# Patient Record
Sex: MALE | Race: WHITE | NOT HISPANIC OR LATINO | Employment: OTHER | URBAN - METROPOLITAN AREA
[De-identification: names, ages, dates, MRNs, and addresses within clinical notes are randomized per-mention and may not be internally consistent; named-entity substitution may affect disease eponyms.]

---

## 2017-04-17 ENCOUNTER — ALLSCRIPTS OFFICE VISIT (OUTPATIENT)
Dept: OTHER | Facility: OTHER | Age: 62
End: 2017-04-17

## 2017-05-19 ENCOUNTER — ALLSCRIPTS OFFICE VISIT (OUTPATIENT)
Dept: OTHER | Facility: OTHER | Age: 62
End: 2017-05-19

## 2017-05-19 DIAGNOSIS — M10.9 GOUT: ICD-10-CM

## 2017-05-19 DIAGNOSIS — E11.9 TYPE 2 DIABETES MELLITUS WITHOUT COMPLICATIONS (HCC): ICD-10-CM

## 2017-05-19 DIAGNOSIS — I10 ESSENTIAL (PRIMARY) HYPERTENSION: ICD-10-CM

## 2017-05-25 ENCOUNTER — APPOINTMENT (OUTPATIENT)
Dept: LAB | Age: 62
End: 2017-05-25
Payer: COMMERCIAL

## 2017-05-25 ENCOUNTER — TRANSCRIBE ORDERS (OUTPATIENT)
Dept: ADMINISTRATIVE | Age: 62
End: 2017-05-25

## 2017-05-25 DIAGNOSIS — E11.9 TYPE 2 DIABETES MELLITUS WITHOUT COMPLICATIONS (HCC): ICD-10-CM

## 2017-05-25 DIAGNOSIS — I10 ESSENTIAL (PRIMARY) HYPERTENSION: ICD-10-CM

## 2017-05-25 DIAGNOSIS — M10.9 GOUT: ICD-10-CM

## 2017-05-25 LAB
ALBUMIN SERPL BCP-MCNC: 3.9 G/DL (ref 3.5–5)
ALP SERPL-CCNC: 65 U/L (ref 46–116)
ALT SERPL W P-5'-P-CCNC: 35 U/L (ref 12–78)
ANION GAP SERPL CALCULATED.3IONS-SCNC: 5 MMOL/L (ref 4–13)
AST SERPL W P-5'-P-CCNC: 23 U/L (ref 5–45)
BILIRUB SERPL-MCNC: 0.52 MG/DL (ref 0.2–1)
BUN SERPL-MCNC: 21 MG/DL (ref 5–25)
CALCIUM SERPL-MCNC: 8.7 MG/DL (ref 8.3–10.1)
CHLORIDE SERPL-SCNC: 105 MMOL/L (ref 100–108)
CHOLEST SERPL-MCNC: 177 MG/DL (ref 50–200)
CO2 SERPL-SCNC: 27 MMOL/L (ref 21–32)
CREAT SERPL-MCNC: 1.06 MG/DL (ref 0.6–1.3)
EST. AVERAGE GLUCOSE BLD GHB EST-MCNC: 146 MG/DL
GFR SERPL CREATININE-BSD FRML MDRD: >60 ML/MIN/1.73SQ M
GLUCOSE P FAST SERPL-MCNC: 107 MG/DL (ref 65–99)
HBA1C MFR BLD: 6.7 % (ref 4.2–6.3)
HDLC SERPL-MCNC: 50 MG/DL (ref 40–60)
LDLC SERPL CALC-MCNC: 99 MG/DL (ref 0–100)
POTASSIUM SERPL-SCNC: 5.4 MMOL/L (ref 3.5–5.3)
PROT SERPL-MCNC: 7 G/DL (ref 6.4–8.2)
SODIUM SERPL-SCNC: 137 MMOL/L (ref 136–145)
TRIGL SERPL-MCNC: 140 MG/DL
TSH SERPL DL<=0.05 MIU/L-ACNC: 1.68 UIU/ML (ref 0.36–3.74)
URATE SERPL-MCNC: 3.8 MG/DL (ref 4.2–8)

## 2017-05-25 PROCEDURE — 84550 ASSAY OF BLOOD/URIC ACID: CPT

## 2017-05-25 PROCEDURE — 36415 COLL VENOUS BLD VENIPUNCTURE: CPT

## 2017-05-25 PROCEDURE — 83036 HEMOGLOBIN GLYCOSYLATED A1C: CPT

## 2017-05-25 PROCEDURE — 80053 COMPREHEN METABOLIC PANEL: CPT

## 2017-05-25 PROCEDURE — 80061 LIPID PANEL: CPT

## 2017-05-25 PROCEDURE — 84443 ASSAY THYROID STIM HORMONE: CPT

## 2017-05-26 ENCOUNTER — GENERIC CONVERSION - ENCOUNTER (OUTPATIENT)
Dept: OTHER | Facility: OTHER | Age: 62
End: 2017-05-26

## 2017-06-20 ENCOUNTER — ALLSCRIPTS OFFICE VISIT (OUTPATIENT)
Dept: OTHER | Facility: OTHER | Age: 62
End: 2017-06-20

## 2017-06-20 DIAGNOSIS — M71.9 BURSOPATHY: ICD-10-CM

## 2017-06-20 LAB
BILIRUB UR QL STRIP: NORMAL
CLARITY UR: NORMAL
COLOR UR: YELLOW
GLUCOSE (HISTORICAL): NORMAL
HGB UR QL STRIP.AUTO: NORMAL
KETONES UR STRIP-MCNC: NORMAL MG/DL
LEUKOCYTE ESTERASE UR QL STRIP: NORMAL
NITRITE UR QL STRIP: NORMAL
PH UR STRIP.AUTO: 6 [PH]
PROT UR STRIP-MCNC: NORMAL MG/DL
SP GR UR STRIP.AUTO: 1.01
UROBILINOGEN UR QL STRIP.AUTO: 0.2

## 2017-06-22 ENCOUNTER — ALLSCRIPTS OFFICE VISIT (OUTPATIENT)
Dept: OTHER | Facility: OTHER | Age: 62
End: 2017-06-22

## 2017-06-22 LAB — OCCULT BLD, FECAL IMMUNOLOGICAL (HISTORICAL): NEGATIVE

## 2017-10-08 ENCOUNTER — GENERIC CONVERSION - ENCOUNTER (OUTPATIENT)
Dept: OTHER | Facility: OTHER | Age: 62
End: 2017-10-08

## 2017-10-08 ENCOUNTER — HOSPITAL ENCOUNTER (EMERGENCY)
Facility: HOSPITAL | Age: 62
Discharge: HOME | End: 2017-10-08
Attending: EMERGENCY MEDICINE
Payer: COMMERCIAL

## 2017-10-08 ENCOUNTER — APPOINTMENT (EMERGENCY)
Dept: RADIOLOGY | Facility: HOSPITAL | Age: 62
End: 2017-10-08
Payer: COMMERCIAL

## 2017-10-08 VITALS
HEIGHT: 69 IN | TEMPERATURE: 98.5 F | WEIGHT: 200 LBS | RESPIRATION RATE: 16 BRPM | BODY MASS INDEX: 29.62 KG/M2 | SYSTOLIC BLOOD PRESSURE: 151 MMHG | HEART RATE: 99 BPM | DIASTOLIC BLOOD PRESSURE: 117 MMHG | OXYGEN SATURATION: 97 %

## 2017-10-08 DIAGNOSIS — I99.8 ISCHEMIC FINGER: ICD-10-CM

## 2017-10-08 DIAGNOSIS — S61.209A DEGLOVING INJURY OF FINGER, INITIAL ENCOUNTER: Primary | ICD-10-CM

## 2017-10-08 PROCEDURE — 90715 TDAP VACCINE 7 YRS/> IM: CPT | Performed by: EMERGENCY MEDICINE

## 2017-10-08 PROCEDURE — 96365 THER/PROPH/DIAG IV INF INIT: CPT

## 2017-10-08 PROCEDURE — 90471 IMMUNIZATION ADMIN: CPT

## 2017-10-08 PROCEDURE — 99284 EMERGENCY DEPT VISIT MOD MDM: CPT

## 2017-10-08 PROCEDURE — 73140 X-RAY EXAM OF FINGER(S): CPT

## 2017-10-08 RX ORDER — BACITRACIN, NEOMYCIN, POLYMYXIN B 400; 3.5; 5 [USP'U]/G; MG/G; [USP'U]/G
1 OINTMENT TOPICAL 2 TIMES DAILY
Status: DISCONTINUED | OUTPATIENT
Start: 2017-10-08 | End: 2017-10-08 | Stop reason: HOSPADM

## 2017-10-08 RX ORDER — CEPHALEXIN 500 MG/1
500 CAPSULE ORAL 4 TIMES DAILY
Qty: 28 CAPSULE | Refills: 0 | Status: SHIPPED | OUTPATIENT
Start: 2017-10-08 | End: 2017-10-15

## 2017-10-08 RX ORDER — TRAMADOL HYDROCHLORIDE 50 MG/1
50 TABLET ORAL EVERY 6 HOURS PRN
Qty: 20 TABLET | Refills: 0 | Status: SHIPPED | OUTPATIENT
Start: 2017-10-08 | End: 2017-10-18

## 2017-10-08 RX ORDER — ACETAMINOPHEN 500 MG
1000 TABLET ORAL EVERY 6 HOURS PRN
Qty: 30 TABLET | Refills: 0 | Status: SHIPPED | OUTPATIENT
Start: 2017-10-08 | End: 2018-01-04 | Stop reason: ALTCHOICE

## 2017-10-08 RX ORDER — NAPROXEN 500 MG/1
500 TABLET ORAL 2 TIMES DAILY WITH MEALS
Qty: 20 TABLET | Refills: 0 | Status: SHIPPED | OUTPATIENT
Start: 2017-10-08 | End: 2018-01-04 | Stop reason: ALTCHOICE

## 2017-10-08 RX ORDER — BUPIVACAINE HYDROCHLORIDE 5 MG/ML
15 INJECTION, SOLUTION EPIDURAL; INTRACAUDAL ONCE
Status: COMPLETED | OUTPATIENT
Start: 2017-10-08 | End: 2017-10-08

## 2017-10-08 RX ADMIN — CEFAZOLIN SODIUM 2000 MG: 2 SOLUTION INTRAVENOUS at 10:25

## 2017-10-08 RX ADMIN — BACITRACIN, NEOMYCIN, POLYMYXIN B 1 SMALL APPLICATION: 400; 3.5; 5 OINTMENT TOPICAL at 13:20

## 2017-10-08 RX ADMIN — TETANUS TOXOID, REDUCED DIPHTHERIA TOXOID AND ACELLULAR PERTUSSIS VACCINE, ADSORBED 0.5 ML: 5; 2.5; 8; 8; 2.5 SUSPENSION INTRAMUSCULAR at 10:12

## 2017-10-08 RX ADMIN — BUPIVACAINE HYDROCHLORIDE 15 ML: 5 INJECTION, SOLUTION EPIDURAL; INTRACAUDAL at 10:12

## 2017-10-08 NOTE — ED RE-EVALUATION NOTE
Patient's ring successfully removed the wound was copiously irrigated under high flow to tap water patient is unable to flex the digit either at the PIP or the DIP  There is concern for injury to the tendon complex specifically since this is a zone 2 flexor tendon injury will discuss with hand surgery prior to any sort of attempt at repair

## 2017-10-08 NOTE — ED PROVIDER NOTES
History  Chief Complaint   Patient presents with    Finger Injury - Major     Pt fell 8 ft from a ladder causing a laceration to his L ring finger     This is a 57 y o  old male who presents to the ED for evaluation of finger injury  Patient was climbing a ladder when he fell off and caught his ring on his L ring finger on the ladder during the fall  Has a partial degloving of his finger  There is bleeding in the area  Has decreased range of motion  His ring is still stuck on the finger  The finger is discolored  Is unsure of his last tetanus shot the thinks it is 8-10 years ago  None     Past Medical History:   Diagnosis Date    Hyperlipidemia     Hypertension      History reviewed  No pertinent surgical history  History reviewed  No pertinent family history  I have reviewed and agree with the history as documented  Social History   Substance Use Topics    Smoking status: Never Smoker    Smokeless tobacco: Never Used    Alcohol use Yes     Review of Systems   Constitutional: Negative for chills, fatigue, fever and unexpected weight change  HENT: Negative for congestion, rhinorrhea and sore throat  Eyes: Negative for redness and visual disturbance  Respiratory: Negative for cough and shortness of breath  Cardiovascular: Negative for chest pain and leg swelling  Gastrointestinal: Negative for abdominal pain, constipation, diarrhea, nausea and vomiting  Endocrine: Negative for cold intolerance and heat intolerance  Genitourinary: Negative for dysuria, frequency and urgency  Musculoskeletal: Negative for back pain  Skin: Positive for wound  Negative for rash  Neurological: Negative for dizziness, syncope and numbness  All other systems reviewed and are negative      Physical Exam  ED Triage Vitals [10/08/17 1005]   Temperature Pulse Respirations Blood Pressure SpO2   98 5 °F (36 9 °C) 97 18 (!) 178/82 98 %      Temp Source Heart Rate Source Patient Position - Orthostatic VS BP Location FiO2 (%)   Tympanic Monitor Sitting Right arm --      Pain Score       8         Physical Exam   Constitutional: He is oriented to person, place, and time  He appears well-developed and well-nourished  No distress  HENT:   Head: Normocephalic and atraumatic  Nose: Nose normal    Mouth/Throat: Oropharynx is clear and moist  No oropharyngeal exudate  Eyes: EOM are normal  Pupils are equal, round, and reactive to light  Right eye exhibits no discharge  Left eye exhibits no discharge  Neck: Normal range of motion  Neck supple  No JVD present  No tracheal deviation present  No midline spinal tenderness, no step offs or deformity  Cardiovascular: Normal rate, regular rhythm and normal heart sounds  No murmur heard  Pulses:       Carotid pulses are 2+ on the right side, and 2+ on the left side  Radial pulses are 2+ on the right side, and 2+ on the left side  Femoral pulses are 2+ on the right side, and 2+ on the left side  Dorsalis pedis pulses are 2+ on the right side, and 2+ on the left side  Pulmonary/Chest: Effort normal  No accessory muscle usage  No respiratory distress  He has no decreased breath sounds  He has no wheezes  He has no rales  He exhibits no tenderness, no bony tenderness, no edema and no deformity  Abdominal: Soft  Normal appearance and bowel sounds are normal  He exhibits no mass  There is no tenderness  There is no rigidity and no rebound  No ecchymosis or abrasions, no seat belt sign   Musculoskeletal: Normal range of motion  He exhibits no edema or tenderness  There is a partial degloving of the left ring finger  The lateral aspect of the finger is involved more so than the medial   He has decreased range of motion especially at the DIPJ  There is decreased sensation  Cap refills approximately 10 seconds  Neurological: He is alert and oriented to person, place, and time  He has normal strength   No cranial nerve deficit or sensory deficit  He exhibits normal muscle tone  Coordination normal  GCS eye subscore is 4  GCS verbal subscore is 5  GCS motor subscore is 6  Normal gait   Skin: Skin is warm and dry  Capillary refill takes less than 2 seconds  No rash noted  He is not diaphoretic  Nursing note and vitals reviewed  ED Medications  Medications   ceFAZolin (ANCEF) IVPB (premix) 2,000 mg (0 mg Intravenous Stopped 10/8/17 1126)   tetanus-diphtheria-acellular pertussis (BOOSTRIX) IM injection 0 5 mL (0 5 mL Intramuscular Given 10/8/17 1012)   bupivacaine (PF) (MARCAINE) 0 5 % injection 15 mL (15 mL Injection Given by Other 10/8/17 1012)     Diagnostic Studies  Labs Reviewed - No data to display    XR finger fourth digit-ring LEFT   ED Interpretation   Small avulsion fracture at the base of the middle phalynx of the   4th digit, as interpreted by me  Final Result      Soft tissue swelling/laceration involving the proximal aspect of the 4th digit  No fracture  Workstation performed: [id number]           Procedures  Procedures    Phone Consults  ED Phone Contact    ED Course    A/P: This is a 57 y o  male who presents to the ED for evaluation of finger injury  Given the discoloration, concerned about vascular injury  Plastics was consulted and after initial plan of loose repaired outpatient follow-up, Dr Jhonny Kauffman did elect to come to the ED to explore and repair the wound at this time  I did update the patient's tetanus  IV antibiotics  Digital block for pain  Dr Jhonny Kauffman has seen and evaluated the patient  He repaired the wound as well as applied a splint  Splint was evaluated by me following his placement and distal neurovascular in the unaffected digits was intact  PO ABX for a week  Return precautions discussed  Patient and family acknowledge receipt of same  We will discharge this patient home with plastics follow-up on Friday  Patient is in agreement with this plan as outlined above      SAKINA Fernandez Time    Disposition  Final diagnoses:   Degloving injury of finger, initial encounter   Ischemic finger     ED Disposition     ED Disposition Condition Comment    Discharge  Wells Liliya discharge to home/self care  Condition at discharge: Stable        Follow-up Information     Follow up With Specialties Details Why [address] MD Plastic Surgery Go on 10/13/2017 For reevaluation of finger injury [place]          Discharge Medication List as of 10/8/2017  2:21 PM      START taking these medications    Details   acetaminophen (TYLENOL) 500 mg tablet Take 2 tablets by mouth every 6 (six) hours as needed for mild pain, Starting Sun 10/8/2017, Print      cephalexin (KEFLEX) 500 mg capsule Take 1 capsule by mouth 4 (four) times a day for 7 days, Starting Sun 10/8/2017, Until Sun 10/15/2017, Print      naproxen (NAPROSYN) 500 mg tablet Take 1 tablet by mouth 2 (two) times a day with meals, Starting Sun 10/8/2017, Print      traMADol (ULTRAM) 50 mg tablet Take 1 tablet by mouth every 6 (six) hours as needed for moderate pain for up to 10 days, Starting Sun 10/8/2017, Until Wed 10/18/2017, Print           No discharge procedures on file  ED Provider  Attending physically available and evaluated Geoffrey Lovell I managed the patient along with the ED Attending      Electronically Signed by       Kendal Nguyen MD  Resident  10/08/17 7812

## 2017-10-08 NOTE — ED RE-EVALUATION NOTE
After ring removal and wash out, digit remains somewhat dusky in appearance  Colder than other digits, and cap refill is 10s  Dr garcía repaged and is on his way in to evaluate patient

## 2017-10-08 NOTE — CONSULTS
Consultation - Plastic Surgery   Michael Murphy 57 y o  male MRN: [id number]  Unit/Bed#: [facility] Encounter: [id number]      Assessment/Plan      Assessment:  Avulsion laceration left ring finger with ulnar digital nerve laceration   Plan:  Repair of left ring finger avulsion laceration with ulnar digital nerve repair in the ED  Please see separate procedure note    Discharged home on 1 week of Keflex 500 mg p o  q i d  Keep splint clean dry and intact until follow-up appointment on Friday  Elevate left hand above level of heart at all times  History of Present Illness   Physician Requesting Consult: Carissa Reilly DO  Reason for Consult / Principal Problem:  Avulsion laceration left ring finger  Hx and PE limited by:   HPI: Michael Murphy is a 57y o  year old right-hand-dominant male who presents with a left ring finger avulsion laceration sustained after falling off a ladder, catching his wedding ring on a ladder rung and falling to the ground  He was cleared for any further injuries  There was concern for ischemia of the left ring finger and possible tendon injury  I am asked to evaluate  Consults    Review of Systems   Constitutional: Negative  HENT: Negative  Eyes: Negative  Respiratory: Negative  Cardiovascular: Negative  Gastrointestinal: Negative  Endocrine: Negative  Genitourinary: Negative  Musculoskeletal: Negative  Skin: Positive for color change  Laceration left ring finger   Allergic/Immunologic: Negative  Neurological: Negative  Hematological: Negative  Psychiatric/Behavioral: Negative  Historical Information   Past Medical History:   Diagnosis Date    Hyperlipidemia     Hypertension      History reviewed  No pertinent surgical history    Social History   History   Alcohol Use    Yes     History   Drug Use No     History   Smoking Status    Never Smoker   Smokeless Tobacco    Never Used     Family History: non-contributory    Meds/Allergies   all current active meds have been reviewed    No Known Allergies    Objective   No intake or output data in the 24 hours ending 10/08/17 1328    Invasive Devices     Peripheral Intravenous Line            Peripheral IV 10/08/17 Right Forearm less than 1 day                Physical Exam   Constitutional: He is oriented to person, place, and time  He appears well-developed and well-nourished  HENT:   Head: Normocephalic and atraumatic  Eyes: Conjunctivae are normal  Pupils are equal, round, and reactive to light  Cardiovascular: Normal rate and regular rhythm  Pulmonary/Chest: Effort normal and breath sounds normal    Abdominal: Soft  Musculoskeletal:   Left ring finger range of motion limited by swelling at the PIP joint  Both passive and active range of motion present at PIP joint  Absent range of motion at DIP joint  Passive range of motion intact at DIP joint  Digital nerve block was performed by the ED  Sensory interpretation to left ring finger unable to be performed  Avulsion laceration near circumferential to left ring finger  Radial digital artery nerve and vein intact  Ulnar digital artery nerve and vein lacerated  Neurological: He is alert and oriented to person, place, and time  Skin: Skin is warm and dry  Psychiatric: He has a normal mood and affect  His behavior is normal        Lab Results:   No results found for: WBC, HGB, HCT, MCV, PLT   No results found for: TISSUECULT, WOUNDCULT  Imaging Studies:   EKG, Pathology, and Other Studies:   No results found for: FINALDX    Counseling / Coordination of Care  Total floor / unit time spent today 30 minutes  Greater than 50% of total time was spent with the patient and / or family counseling and / or coordination of care  A description of the counseling / coordination of care:  Coordinating procedure in the ED

## 2017-10-08 NOTE — DISCHARGE INSTRUCTIONS
Finger Laceration   WHAT YOU NEED TO KNOW:   A finger laceration is a deep cut in your skin  It is often caused by a sharp object, such as a knife, or blunt force to your finger  Your blood vessels, bones, joints, tendons, or nerves may also be injured  DISCHARGE INSTRUCTIONS:   Return to the emergency department if:   · Your wound comes apart  · Blood soaks through your bandage  · You have severe pain in your finger or hand  · Your finger is pale and cold  · You have sudden trouble moving your finger  · Your swelling suddenly gets worse  · You have red streaks on your skin coming from your wound  Contact your healthcare provider or hand specialist if:   · You have new numbness or tingling  · Your finger feels warm, looks swollen or red, and is draining pus  · You have a fever  · You have questions or concerns about your condition or care  Medicines: You may  need any of the following:  · Antibiotics  help prevent a bacterial infection  · Acetaminophen  decreases pain and fever  It is available without a doctor's order  Ask how much to take and how often to take it  Follow directions  Read the labels of all other medicines you are using to see if they also contain acetaminophen, or ask your doctor or pharmacist  Acetaminophen can cause liver damage if not taken correctly  Do not use more than 4 grams (4,000 milligrams) total of acetaminophen in one day  · Prescription pain medicine  may be given  Ask your healthcare provider how to take this medicine safely  Some prescription pain medicines contain acetaminophen  Do not take other medicines that contain acetaminophen without talking to your healthcare provider  Too much acetaminophen may cause liver damage  Prescription pain medicine may cause constipation  Ask your healthcare provider how to prevent or treat constipation  · Take your medicine as directed    Contact your healthcare provider if you think your medicine is not helping or if you have side effects  Tell him or her if you are allergic to any medicine  Keep a list of the medicines, vitamins, and herbs you take  Include the amounts, and when and why you take them  Bring the list or the pill bottles to follow-up visits  Carry your medicine list with you in case of an emergency  Self-care:   · Apply ice  on your finger for 15 to 20 minutes every hour or as directed  Use an ice pack, or put crushed ice in a plastic bag  Cover it with a towel before you apply it to your skin  Ice helps prevent tissue damage and decreases swelling and pain  · Elevate  your hand above the level of your heart as often as you can  This will help decrease swelling and pain  Prop your hand on pillows or blankets to keep it elevated comfortably  · Wear your splint as directed  A splint will decrease movement and stress on your wound  The splint may help your wound heal faster  Ask your healthcare provider how to apply and remove a splint  · Apply ointments to decrease scarring  Do not apply ointments until your healthcare provider says it is okay  You may need to wait until your wound is healed  Ask which ointment to buy and how often to use it  Wound care:   · Do not get your wound wet until your healthcare provider says it is okay  Do not soak your hand in water  Do not go swimming until your healthcare provider says it is okay  When your healthcare provider says it is okay, carefully wash around the wound with soap and water  Let soap and water run over your wound  Gently pat the area dry or allow it to air dry  · Change your bandages when they get wet, dirty, or after washing  Apply new, clean bandages as directed  Do not apply elastic bandages or tape too tightly  Do not put powders or lotions on your wound  · Apply antibiotic ointment as directed  Your healthcare provider may give you antibiotic ointment to put over your wound if you have stitches   If you have Strips-Strips over your wound, let them dry up and fall off on their own  If they do not fall off within 14 days, gently remove them  If you have glue over your wound, do not remove or pick at it  If your glue comes off, do not replace it with glue that you have at home  · Check your wound every day for signs of infection  Signs of infection include swelling, redness, or pus  Follow up with your healthcare provider or hand specialist in 2 days:  Write down your questions so you remember to ask them during your visits  © 2017 [employer] [employer]  Information is for End User's use only and may not be sold, redistributed or otherwise used for commercial purposes  All illustrations and images included in CareNotes® are the copyrighted property of [employer] , [employer]  or J[employer] Owen  The above information is an  only  It is not intended as medical advice for individual conditions or treatments  Talk to your doctor, nurse or pharmacist before following any medical regimen to see if it is safe and effective for you

## 2017-10-08 NOTE — PROCEDURES
25-year-old male with an avulsion laceration of his left ring finger that is near circumferential   I discussed with him the risks, benefits and alternatives of repair of the left ring finger laceration as well as repair of the ulnar digital nerve  I discussed with him the risks, benefits and alternatives of the procedure including not limited to risk of bleeding, infection, scar, stiffness and decreased sensation to the left ring finger  He understands these risks and wishes to proceed  I personally obtained his informed consent  The patient was identified in the site was marked  A digital nerve block was already performed by the emergency department staff  The wound was then copiously irrigated with normal saline solution  The left hand was then prepped with Betadine and draped in standard sterile fashion  The wound was then examined under loupe magnification  The ulnar neurovascular bundle was completely lacerated  The radial neurovascular bundle was completely intact  There was good profusion and pulsatile flow  Capillary refill was 2 seconds at this point  The ulnar digital nerve ends were identified  They were repaired under loupe magnification with 7 0 nylon interrupted sutures  The skin was then closed with interrupted 4-0 nylon sutures  At the conclusion of the closure capillary refill was still 2 seconds  The closure was dressed with Neosporin ointment followed by Xeroform gauze followed by 4 x 4 gauze followed by a volar splint  After the splint was placed there remained 2 second capillary refill at the left ring finger  The patient tolerated the procedure well and without complication

## 2017-10-13 ENCOUNTER — GENERIC CONVERSION - ENCOUNTER (OUTPATIENT)
Dept: OTHER | Facility: OTHER | Age: 62
End: 2017-10-13

## 2017-10-13 ENCOUNTER — ALLSCRIPTS OFFICE VISIT (OUTPATIENT)
Dept: OTHER | Facility: OTHER | Age: 62
End: 2017-10-13

## 2017-10-20 ENCOUNTER — ALLSCRIPTS OFFICE VISIT (OUTPATIENT)
Dept: OTHER | Facility: OTHER | Age: 62
End: 2017-10-20

## 2017-11-02 ENCOUNTER — GENERIC CONVERSION - ENCOUNTER (OUTPATIENT)
Dept: OTHER | Facility: OTHER | Age: 62
End: 2017-11-02

## 2017-11-02 DIAGNOSIS — S61.209A: ICD-10-CM

## 2017-11-02 DIAGNOSIS — I10 ESSENTIAL (PRIMARY) HYPERTENSION: ICD-10-CM

## 2017-11-02 DIAGNOSIS — E11.9 TYPE 2 DIABETES MELLITUS WITHOUT COMPLICATIONS (HCC): ICD-10-CM

## 2017-11-10 NOTE — PROGRESS NOTES
Post-Op  Post Op [facility]: 77-year-old male status post avulsion laceration of his left ring finger  He has some sensation at the radial aspect of his ring finger and minimal sensation to the ulnar aspect on the side of his digital nerve repair  He is healing well  I will see him again in 1-2 weeks to reevaluate for clearance for occupational therapy  His sutures were removed today  Active Problems  1  Avulsion of finger (883 0) (S61 209A)   2  Backache (724 5) (M54 9)   3  Bursitis (727 3) (M71 9)   4  Diverticulosis (562 10) (K57 90)   5  DM2 (diabetes mellitus, type 2) (250 00) (E11 9)   6  Esophageal reflux (530 81) (K21 9)   7  Gout (274 9) (M10 9)   8  Hordeolum internum (373 12) (H00 029)   9  Hyperlipidemia (272 4) (E78 5)   10  Hypertension (401 9) (I10)   11  Knee pain (719 46) (M25 569)   12  Need for prophylactic vaccination and inoculation against influenza (V04 81) (Z23)   13  Other abnormal finding of urine (791 9) (R82 99)   14  Paronychia of toe (681 11) (L03 039)   15  Prepatellar bursitis (726 65) (M70 40)    Social History     · Never A Smoker    Current Meds   1  Benicar 40 MG Oral Tablet; TAKE ONE (1) TABLET daily; Therapy: [id number] to  Requested for: [id number] Recorded   2  Daily Multivitamin TABS; TAKE 1 TABLET DAILY; Therapy: (Recorded:[id number]) to Recorded   3  Uloric 80 MG Oral Tablet; take 1 tablet by mouth every day; Therapy: 41BTW9239 to (Last Rx:18Nov2016)  Requested for: [id number] Ordered   4  Zetia 10 MG Oral Tablet; TAKE 1 TABLET EVERY DAY; Therapy: 74GNT6462 to (Evaluate:13Nov2017)  Requested for: 03SWE9873; Last Rx:18Nov2016 Ordered    Allergies  1  No Known Drug Allergies    Vitals   Recorded: 73BNH1824 02:27PM   BP Comments unobtainable   Height 5 ft 10 in   Weight 200 lb    BMI Calculated 28 7   BSA Calculated 2 09     Future Appointments    Date/Time Provider Specialty Site   16/49/9421 37:68 AM CRESENCIO Maldonado   [facility] [address] [place] [address] 01/11/2018 10:30 AM CRESENCIO Luna       Signatures   Electronically signed by : CRESENCIO Moreno ; Nov 8 2017  3:34PM EST                       (Author)

## 2017-11-13 ENCOUNTER — APPOINTMENT (OUTPATIENT)
Dept: OCCUPATIONAL THERAPY | Age: 62
End: 2017-11-13
Payer: COMMERCIAL

## 2017-11-13 DIAGNOSIS — S61.209A: ICD-10-CM

## 2017-11-13 PROCEDURE — G8990 OTHER PT/OT CURRENT STATUS: HCPCS

## 2017-11-13 PROCEDURE — 97165 OT EVAL LOW COMPLEX 30 MIN: CPT

## 2017-11-13 PROCEDURE — G8991 OTHER PT/OT GOAL STATUS: HCPCS

## 2017-11-14 ENCOUNTER — GENERIC CONVERSION - ENCOUNTER (OUTPATIENT)
Dept: OTHER | Facility: OTHER | Age: 62
End: 2017-11-14

## 2017-11-16 ENCOUNTER — APPOINTMENT (OUTPATIENT)
Dept: OCCUPATIONAL THERAPY | Age: 62
End: 2017-11-16
Payer: COMMERCIAL

## 2017-11-16 PROCEDURE — 97110 THERAPEUTIC EXERCISES: CPT

## 2017-11-16 PROCEDURE — 97140 MANUAL THERAPY 1/> REGIONS: CPT

## 2017-11-16 PROCEDURE — 97018 PARAFFIN BATH THERAPY: CPT

## 2017-11-20 ENCOUNTER — GENERIC CONVERSION - ENCOUNTER (OUTPATIENT)
Dept: OTHER | Facility: OTHER | Age: 62
End: 2017-11-20

## 2017-11-21 ENCOUNTER — TRANSCRIBE ORDERS (OUTPATIENT)
Dept: ADMINISTRATIVE | Age: 62
End: 2017-11-21

## 2017-11-21 ENCOUNTER — GENERIC CONVERSION - ENCOUNTER (OUTPATIENT)
Dept: OTHER | Facility: OTHER | Age: 62
End: 2017-11-21

## 2017-11-21 ENCOUNTER — APPOINTMENT (OUTPATIENT)
Dept: LAB | Age: 62
End: 2017-11-21
Payer: COMMERCIAL

## 2017-11-21 DIAGNOSIS — I10 ESSENTIAL (PRIMARY) HYPERTENSION: ICD-10-CM

## 2017-11-21 DIAGNOSIS — E11.9 TYPE 2 DIABETES MELLITUS WITHOUT COMPLICATIONS (HCC): ICD-10-CM

## 2017-11-21 LAB
ALBUMIN SERPL BCP-MCNC: 4 G/DL (ref 3.5–5)
ALP SERPL-CCNC: 53 U/L (ref 46–116)
ALT SERPL W P-5'-P-CCNC: 39 U/L (ref 12–78)
ANION GAP SERPL CALCULATED.3IONS-SCNC: 6 MMOL/L (ref 4–13)
AST SERPL W P-5'-P-CCNC: 18 U/L (ref 5–45)
BASOPHILS # BLD AUTO: 0.05 THOUSANDS/ΜL (ref 0–0.1)
BASOPHILS NFR BLD AUTO: 1 % (ref 0–1)
BILIRUB SERPL-MCNC: 0.61 MG/DL (ref 0.2–1)
BUN SERPL-MCNC: 25 MG/DL (ref 5–25)
CALCIUM SERPL-MCNC: 9 MG/DL (ref 8.3–10.1)
CHLORIDE SERPL-SCNC: 103 MMOL/L (ref 100–108)
CHOLEST SERPL-MCNC: 171 MG/DL (ref 50–200)
CO2 SERPL-SCNC: 28 MMOL/L (ref 21–32)
CREAT SERPL-MCNC: 1.09 MG/DL (ref 0.6–1.3)
EOSINOPHIL # BLD AUTO: 0.15 THOUSAND/ΜL (ref 0–0.61)
EOSINOPHIL NFR BLD AUTO: 2 % (ref 0–6)
ERYTHROCYTE [DISTWIDTH] IN BLOOD BY AUTOMATED COUNT: 12.4 % (ref 11.6–15.1)
EST. AVERAGE GLUCOSE BLD GHB EST-MCNC: 146 MG/DL
GFR SERPL CREATININE-BSD FRML MDRD: 71 ML/MIN/1.73SQ M
GLUCOSE P FAST SERPL-MCNC: 123 MG/DL (ref 65–99)
HBA1C MFR BLD: 6.7 % (ref 4.2–6.3)
HCT VFR BLD AUTO: 43 % (ref 36.5–49.3)
HDLC SERPL-MCNC: 45 MG/DL (ref 40–60)
HGB BLD-MCNC: 14.8 G/DL (ref 12–17)
LDLC SERPL CALC-MCNC: 86 MG/DL (ref 0–100)
LYMPHOCYTES # BLD AUTO: 1.97 THOUSANDS/ΜL (ref 0.6–4.47)
LYMPHOCYTES NFR BLD AUTO: 31 % (ref 14–44)
MCH RBC QN AUTO: 32.1 PG (ref 26.8–34.3)
MCHC RBC AUTO-ENTMCNC: 34.4 G/DL (ref 31.4–37.4)
MCV RBC AUTO: 93 FL (ref 82–98)
MONOCYTES # BLD AUTO: 0.58 THOUSAND/ΜL (ref 0.17–1.22)
MONOCYTES NFR BLD AUTO: 9 % (ref 4–12)
NEUTROPHILS # BLD AUTO: 3.62 THOUSANDS/ΜL (ref 1.85–7.62)
NEUTS SEG NFR BLD AUTO: 57 % (ref 43–75)
NRBC BLD AUTO-RTO: 0 /100 WBCS
PLATELET # BLD AUTO: 250 THOUSANDS/UL (ref 149–390)
PMV BLD AUTO: 10.6 FL (ref 8.9–12.7)
POTASSIUM SERPL-SCNC: 4.8 MMOL/L (ref 3.5–5.3)
PROT SERPL-MCNC: 7.3 G/DL (ref 6.4–8.2)
RBC # BLD AUTO: 4.61 MILLION/UL (ref 3.88–5.62)
SODIUM SERPL-SCNC: 137 MMOL/L (ref 136–145)
TRIGL SERPL-MCNC: 199 MG/DL
TSH SERPL DL<=0.05 MIU/L-ACNC: 1.76 UIU/ML (ref 0.36–3.74)
WBC # BLD AUTO: 6.38 THOUSAND/UL (ref 4.31–10.16)

## 2017-11-21 PROCEDURE — 84443 ASSAY THYROID STIM HORMONE: CPT

## 2017-11-21 PROCEDURE — 85025 COMPLETE CBC W/AUTO DIFF WBC: CPT

## 2017-11-21 PROCEDURE — 36415 COLL VENOUS BLD VENIPUNCTURE: CPT

## 2017-11-21 PROCEDURE — 80061 LIPID PANEL: CPT

## 2017-11-21 PROCEDURE — 83036 HEMOGLOBIN GLYCOSYLATED A1C: CPT

## 2017-11-21 PROCEDURE — 80053 COMPREHEN METABOLIC PANEL: CPT

## 2017-11-28 ENCOUNTER — APPOINTMENT (OUTPATIENT)
Dept: OCCUPATIONAL THERAPY | Age: 62
End: 2017-11-28
Payer: COMMERCIAL

## 2017-11-28 PROCEDURE — 97110 THERAPEUTIC EXERCISES: CPT

## 2017-11-28 PROCEDURE — 97140 MANUAL THERAPY 1/> REGIONS: CPT

## 2017-11-28 PROCEDURE — 97018 PARAFFIN BATH THERAPY: CPT

## 2017-11-30 ENCOUNTER — APPOINTMENT (OUTPATIENT)
Dept: OCCUPATIONAL THERAPY | Age: 62
End: 2017-11-30
Payer: COMMERCIAL

## 2017-11-30 ENCOUNTER — GENERIC CONVERSION - ENCOUNTER (OUTPATIENT)
Dept: OTHER | Facility: OTHER | Age: 62
End: 2017-11-30

## 2017-11-30 PROCEDURE — 97110 THERAPEUTIC EXERCISES: CPT

## 2017-11-30 PROCEDURE — 97018 PARAFFIN BATH THERAPY: CPT

## 2017-11-30 PROCEDURE — 97140 MANUAL THERAPY 1/> REGIONS: CPT

## 2017-12-04 ENCOUNTER — APPOINTMENT (OUTPATIENT)
Dept: OCCUPATIONAL THERAPY | Age: 62
End: 2017-12-04
Payer: COMMERCIAL

## 2017-12-04 PROCEDURE — 97140 MANUAL THERAPY 1/> REGIONS: CPT

## 2017-12-04 PROCEDURE — 97018 PARAFFIN BATH THERAPY: CPT

## 2017-12-04 PROCEDURE — 97110 THERAPEUTIC EXERCISES: CPT

## 2017-12-07 ENCOUNTER — APPOINTMENT (OUTPATIENT)
Dept: OCCUPATIONAL THERAPY | Age: 62
End: 2017-12-07
Payer: COMMERCIAL

## 2017-12-07 PROCEDURE — G8991 OTHER PT/OT GOAL STATUS: HCPCS

## 2017-12-07 PROCEDURE — 97018 PARAFFIN BATH THERAPY: CPT

## 2017-12-07 PROCEDURE — 97110 THERAPEUTIC EXERCISES: CPT

## 2017-12-07 PROCEDURE — G8990 OTHER PT/OT CURRENT STATUS: HCPCS

## 2017-12-08 ENCOUNTER — TRANSCRIBE ORDERS (OUTPATIENT)
Dept: ADMINISTRATIVE | Facility: HOSPITAL | Age: 62
End: 2017-12-08

## 2017-12-08 ENCOUNTER — GENERIC CONVERSION - ENCOUNTER (OUTPATIENT)
Dept: OTHER | Facility: OTHER | Age: 62
End: 2017-12-08

## 2017-12-08 DIAGNOSIS — S61.209A: ICD-10-CM

## 2017-12-08 DIAGNOSIS — S63.259A OPEN DISLOCATION OF FINGER, INITIAL ENCOUNTER: Primary | ICD-10-CM

## 2017-12-08 DIAGNOSIS — S61.209A OPEN DISLOCATION OF FINGER, INITIAL ENCOUNTER: Primary | ICD-10-CM

## 2017-12-12 ENCOUNTER — APPOINTMENT (OUTPATIENT)
Dept: OCCUPATIONAL THERAPY | Age: 62
End: 2017-12-12
Payer: COMMERCIAL

## 2017-12-13 ENCOUNTER — HOSPITAL ENCOUNTER (OUTPATIENT)
Dept: RADIOLOGY | Age: 62
Discharge: HOME | End: 2017-12-13
Payer: COMMERCIAL

## 2017-12-13 DIAGNOSIS — S61.209A: ICD-10-CM

## 2017-12-13 PROCEDURE — 73220 MRI UPPR EXTREMITY W/O&W/DYE: CPT

## 2017-12-13 PROCEDURE — A9585 GADOBUTROL INJECTION: HCPCS | Performed by: RADIOLOGY

## 2017-12-13 RX ADMIN — GADOBUTROL 9 ML: 604.72 INJECTION INTRAVENOUS at 17:45

## 2017-12-14 ENCOUNTER — APPOINTMENT (OUTPATIENT)
Dept: OCCUPATIONAL THERAPY | Age: 62
End: 2017-12-14
Payer: COMMERCIAL

## 2017-12-14 ENCOUNTER — GENERIC CONVERSION - ENCOUNTER (OUTPATIENT)
Dept: OTHER | Facility: OTHER | Age: 62
End: 2017-12-14

## 2017-12-19 ENCOUNTER — APPOINTMENT (OUTPATIENT)
Dept: OCCUPATIONAL THERAPY | Age: 62
End: 2017-12-19
Payer: COMMERCIAL

## 2017-12-21 ENCOUNTER — APPOINTMENT (OUTPATIENT)
Dept: OCCUPATIONAL THERAPY | Age: 62
End: 2017-12-21
Payer: COMMERCIAL

## 2018-01-04 RX ORDER — OLMESARTAN MEDOXOMIL 40 MG/1
40 TABLET ORAL DAILY
COMMUNITY
End: 2018-03-14 | Stop reason: SDUPTHER

## 2018-01-04 RX ORDER — FEBUXOSTAT 80 MG/1
80 TABLET, FILM COATED ORAL DAILY
COMMUNITY

## 2018-01-04 RX ORDER — MULTIVIT-MIN/IRON FUM/FOLIC AC 7.5 MG-4
1 TABLET ORAL DAILY
COMMUNITY
End: 2018-01-26 | Stop reason: SDUPTHER

## 2018-01-04 RX ORDER — EZETIMIBE 10 MG/1
10 TABLET ORAL DAILY
COMMUNITY
End: 2018-03-14 | Stop reason: SDUPTHER

## 2018-01-04 NOTE — PRE-PROCEDURE INSTRUCTIONS
Pre-Surgery Instructions:   Medication Instructions    ezetimibe (ZETIA) 10 mg tablet Instructed patient per Anesthesia Guidelines   febuxostat (ULORIC) 80 MG TABS Instructed patient per Anesthesia Guidelines   Multiple Vitamins-Minerals (MULTIVITAMIN WITH MINERALS) tablet Instructed patient per Anesthesia Guidelines   olmesartan (BENICAR) 40 mg tablet Instructed patient per Anesthesia Guidelines

## 2018-01-07 NOTE — H&P
H&P - Plastic Surgery   Tyler Diez 57 y o  male MRN: [id number]  Unit/Bed#:  Encounter: [id number]           Assessment:  tear of flexor digitorum profundus  Plan:  Repair of flexor tendon        HPI:   Tyler Diez is a 18-year-old male status post avulsion laceration of his left ring finger  He is healing well  HIS range of motion and strength in the PIP joint are satisfactory  he can passively flex his DIP joint he is unable to actively flex it  shows complete tear of flexor digitorum profundus  He will be scheduled for surgery to repair it  REVIEW OF SYSTEMS    GENERAL/CONSTITUTIONAL: The patient denies fever, fatigue, weakness, weight gain or weight loss  HEAD, EYES, EARS, NOSE AND THROAT: Eyes - The patient denies pain, redness, loss of vision, double or blurred vision and denies wearing glasses  The patient denies ringing in the ears, nosebleeds sinusitis, post nasal drip  Also denies frequent sore throats, hoarseness, painful swallowing  CARDIOVASCULAR: The patient denies chest pain, irregular heartbeats, palpitations, shortness of breath, heart murmurs, high blood pressure, cramps in his legs with walking, pain in his feet or toes at night or varicose veins  RESPIRATORY: The patient denies chronic cough, wheezing or night sweats  GASTROINTESTINAL: The patient denies decreased appetite, nausea, vomiting, diarrhea, constipation, blood in the stools  GENITOURINARY: The patient denies difficult urination, pain or burning with urination, blood in the urine  MUSCULOSKELETAL: status post avulsion laceration of his left ring finger  He is healing well  HIS range of motion and strength in the PIP joint are satisfactory  he can passively flex his DIP joint he is unable to actively flex it  The patient denies arm, thigh or calf cramps  No joint or muscle pain  No muscle weakness or tenderness  No joint swelling, neck pain, back pain or major orthopedic injuries    SKIN AND BREASTS: The patient denies easy bruising, skin redness, skin rash, hives  NEUROLOGIC: The patient denies headache, dizziness, fainting, memory loss  PSYCHIATRIC: The patient denies depression anxiety  ENDOCRINE: The patient denies intolerance to hot or cold temperature, flushing, fingernail changes, increased thirst, increased salt intake or decreased sexual desire  HEMATOLOGIC/LYMPHATIC: The patient denies anemia, bleeding tendency or clotting tendency  ALLERGIC/IMMUNOLOGIC: The patient denies rhinitis, asthma, skin sensitivity, latex allergies or sensitivity  Historical Information   Past Medical History:   Diagnosis Date    Hyperlipidemia     Hypertension      Past Surgical History:   Procedure Laterality Date    COLONOSCOPY      KNEE ARTHROSCOPY      SHOULDER ARTHROPLASTY       Social History   History   Alcohol Use    Yes     Comment: MOD     History   Drug Use No     History   Smoking Status    Never Smoker   Smokeless Tobacco    Never Used     Family History: non-contributory    Meds/Allergies   current meds:   1  Daily Multivitamin TABS; TAKE 1 TABLET DAILY; Therapy: (Recorded:70Gks1854) to Recorded   2  Ezetimibe 10 MG Oral Tablet; Take 1 tablet daily; Therapy: 79OXT3771 to (Last Rx:20Nov2017)  Requested for: 20Nov2017 Ordered   3  Olmesartan Medoxomil 40 MG Oral Tablet; TAKE ONE (1) TABLET(S) ONCE DAILY; Therapy: 05FUT7212 to (Last Rx:20Nov2017)  Requested for: 20Nov2017 Ordered   4  Uloric 80 MG Oral Tablet; take 1 tablet by mouth every day;         Objective     There were no vitals taken for this visit      General Appearance:    Alert, cooperative, no distress, appears stated age   Head:    Normocephalic, without obvious abnormality, atraumatic   Eyes:    PERRL, conjunctiva/corneas clear, EOM's intact, fundi     benign, both eyes        Ears:    Normal TM's and external ear canals, both ears   Nose:   Nares normal, septum midline, mucosa normal, no drainage    or sinus tenderness   Throat: Lips, mucosa, and tongue normal; teeth and gums normal   Neck:   Supple, symmetrical, trachea midline, no adenopathy;        thyroid:  No enlargement/tenderness/nodules; no carotid    bruit or JVD   Back:     Symmetric, no curvature, ROM normal, no CVA tenderness   Lungs:     Clear to auscultation bilaterally, respirations unlabored   Chest wall:    No tenderness or deformity   Heart:    Regular rate and rhythm, S1 and S2 normal, no murmur, rub   or gallop   Abdomen:     Soft, non-tender, bowel sounds active all four quadrants,     no masses, no organomegaly           Extremities:   Deep laceration to the left 4th digit  Patient has loss of feeling distal to the laceration, and unable to completely extend finger  Extremities normal, atraumatic, no cyanosis or edema   Pulses:   2+ and symmetric all extremities   Skin:   Skin color, texture, turgor normal, no rashes or lesions   Lymph nodes:   Cervical, supraclavicular, and axillary nodes normal   Neurologic:   CNII-XII intact  Normal strength, sensation and reflexes       throughout       Lab Results:   Lab Results   Component Value Date    WBC 6 38 11/21/2017    HGB 14 8 11/21/2017    HCT 43 0 11/21/2017    MCV 93 11/21/2017     11/21/2017          No results found for: TISSUECULT, WOUNDCULT      Imaging Studies:   No results found      EKG, Pathology, and Other Studies:   No results found for: FINALDX    No intake or output data in the 24 hours ending 01/06/18 2025    Invasive Devices          No matching active lines, drains, or airways          VTE Prophylaxis: Sequential compression device Abbey Copeland)     Code Status: No Order  Advance Directive and Living Will:      Power of :

## 2018-01-10 NOTE — PROCEDURES
Procedures by Suzan Phillips MD at 10/8/2017  1:34  PM      Author:  Suzan Phillips MD Service:  Plastic Surgery Author Type:  Physician    Filed:  10/8/2017  1:39 PM Date of Service:  10/8/2017  1:34 PM Status:  Signed    :  Suzan Phillips MD (Physician)        Pre-procedure Diagnoses:       1  Laceration of left ring finger without foreign body without damage to nail, initial encounter [S61 215A]                Post-procedure Diagnoses:       1  Laceration of left ring finger without foreign body without damage to nail, initial encounter [S61 215A]       2  Digital nerve laceration, finger, initial encounter [S64 40XA]                Procedures:       1  LACERATION REPAIR [id number] (Custom)]       2  NERVE REPAIR [[id number] (Custom)]                 57-year-old male with an avulsion laceration of his left ring finger that is near circumferential   I discussed with him the risks, benefits and alternatives of repair of the left ring  finger laceration as well as repair of the ulnar digital nerve  I discussed with him the risks, benefits and alternatives of the procedure including not limited to risk of bleeding, infection, scar, stiffness and decreased sensation to the left ring finger  He understands these risks and wishes to proceed  I personally obtained his informed consent  The patient was identified in the site was marked  A digital nerve block was already performed by the emergency department staff  The wound was then copiously irrigated with normal saline solution  The left hand was then prepped with Betadine and draped  in standard sterile fashion  The wound was then examined under loupe magnification  The ulnar neurovascular bundle was completely lacerated  The radial neurovascular bundle was completely intact  There was good profusion and pulsatile flow  Capillary  refill was 2 seconds at this point  The ulnar digital nerve ends were identified    They were repaired under loupe magnification with 7 0 nylon interrupted sutures  The skin was then closed with interrupted 4-0 nylon sutures  At the conclusion of the  closure capillary refill was still 2 seconds  The closure was dressed with Neosporin ointment followed by Xeroform gauze followed by 4 x 4 gauze followed by a volar splint  After the splint was placed there remained 2 second capillary refill at the left ring finger  The patient tolerated the procedure  well and without complication  Joselyn STRICKLAND    Oct  8 2017  1:40PM [facility] Standard Time

## 2018-01-11 ENCOUNTER — ANESTHESIA EVENT (OUTPATIENT)
Dept: PERIOP | Facility: HOSPITAL | Age: 63
End: 2018-01-11
Payer: MEDICARE

## 2018-01-12 ENCOUNTER — HOSPITAL ENCOUNTER (OUTPATIENT)
Facility: HOSPITAL | Age: 63
Setting detail: OUTPATIENT SURGERY
Discharge: HOME | End: 2018-01-12
Attending: SURGERY | Admitting: SURGERY
Payer: MEDICARE

## 2018-01-12 ENCOUNTER — HOSPITAL ENCOUNTER (OUTPATIENT)
Dept: RADIOLOGY | Facility: HOSPITAL | Age: 63
Setting detail: OUTPATIENT SURGERY
Discharge: HOME | End: 2018-01-12
Payer: MEDICARE

## 2018-01-12 ENCOUNTER — ANESTHESIA (OUTPATIENT)
Dept: PERIOP | Facility: HOSPITAL | Age: 63
End: 2018-01-12
Payer: MEDICARE

## 2018-01-12 VITALS
DIASTOLIC BLOOD PRESSURE: 87 MMHG | WEIGHT: 205 LBS | RESPIRATION RATE: 18 BRPM | SYSTOLIC BLOOD PRESSURE: 138 MMHG | HEART RATE: 66 BPM | BODY MASS INDEX: 30.36 KG/M2 | TEMPERATURE: 99.4 F | HEIGHT: 69 IN | OXYGEN SATURATION: 97 %

## 2018-01-12 DIAGNOSIS — S61.209A AVULSION OF FINGER TIP, INITIAL ENCOUNTER: ICD-10-CM

## 2018-01-12 DEVICE — IMPLANTABLE DEVICE: Type: IMPLANTABLE DEVICE | Site: FINGER | Status: FUNCTIONAL

## 2018-01-12 RX ORDER — ROPIVACAINE HYDROCHLORIDE 2 MG/ML
INJECTION, SOLUTION EPIDURAL; INFILTRATION; PERINEURAL AS NEEDED
Status: DISCONTINUED | OUTPATIENT
Start: 2018-01-12 | End: 2018-01-12 | Stop reason: SURG

## 2018-01-12 RX ORDER — GABAPENTIN 300 MG/1
300 CAPSULE ORAL ONCE
Status: COMPLETED | OUTPATIENT
Start: 2018-01-12 | End: 2018-01-12

## 2018-01-12 RX ORDER — CLONIDINE 100 UG/ML
INJECTION, SOLUTION EPIDURAL AS NEEDED
Status: DISCONTINUED | OUTPATIENT
Start: 2018-01-12 | End: 2018-01-12 | Stop reason: SURG

## 2018-01-12 RX ORDER — ACETAMINOPHEN 500 MG
500 TABLET ORAL EVERY 6 HOURS
Qty: 30 TABLET | Refills: 0 | Status: SHIPPED | OUTPATIENT
Start: 2018-01-12 | End: 2018-01-19

## 2018-01-12 RX ORDER — DIPHENHYDRAMINE HYDROCHLORIDE 50 MG/ML
12.5 INJECTION INTRAMUSCULAR; INTRAVENOUS ONCE AS NEEDED
Status: DISCONTINUED | OUTPATIENT
Start: 2018-01-12 | End: 2018-01-12 | Stop reason: HOSPADM

## 2018-01-12 RX ORDER — SODIUM CHLORIDE, SODIUM LACTATE, POTASSIUM CHLORIDE, CALCIUM CHLORIDE 600; 310; 30; 20 MG/100ML; MG/100ML; MG/100ML; MG/100ML
125 INJECTION, SOLUTION INTRAVENOUS CONTINUOUS
Status: DISCONTINUED | OUTPATIENT
Start: 2018-01-12 | End: 2018-01-12 | Stop reason: HOSPADM

## 2018-01-12 RX ORDER — ONDANSETRON 2 MG/ML
4 INJECTION INTRAMUSCULAR; INTRAVENOUS ONCE AS NEEDED
Status: DISCONTINUED | OUTPATIENT
Start: 2018-01-12 | End: 2018-01-12 | Stop reason: HOSPADM

## 2018-01-12 RX ORDER — SODIUM CHLORIDE, SODIUM LACTATE, POTASSIUM CHLORIDE, CALCIUM CHLORIDE 600; 310; 30; 20 MG/100ML; MG/100ML; MG/100ML; MG/100ML
75 INJECTION, SOLUTION INTRAVENOUS CONTINUOUS
Status: DISCONTINUED | OUTPATIENT
Start: 2018-01-12 | End: 2018-01-12 | Stop reason: HOSPADM

## 2018-01-12 RX ORDER — SODIUM CHLORIDE, SODIUM LACTATE, POTASSIUM CHLORIDE, CALCIUM CHLORIDE 600; 310; 30; 20 MG/100ML; MG/100ML; MG/100ML; MG/100ML
100 INJECTION, SOLUTION INTRAVENOUS CONTINUOUS
Status: DISCONTINUED | OUTPATIENT
Start: 2018-01-12 | End: 2018-01-12 | Stop reason: HOSPADM

## 2018-01-12 RX ORDER — MIDAZOLAM HYDROCHLORIDE 1 MG/ML
INJECTION INTRAMUSCULAR; INTRAVENOUS AS NEEDED
Status: DISCONTINUED | OUTPATIENT
Start: 2018-01-12 | End: 2018-01-12 | Stop reason: SURG

## 2018-01-12 RX ORDER — NAPROXEN 500 MG/1
500 TABLET ORAL 2 TIMES DAILY WITH MEALS
Qty: 60 TABLET | Refills: 0 | Status: SHIPPED | OUTPATIENT
Start: 2018-01-12 | End: 2018-02-11

## 2018-01-12 RX ORDER — OXYCODONE HYDROCHLORIDE 5 MG/1
5 TABLET ORAL EVERY 4 HOURS PRN
Qty: 31 TABLET | Refills: 0 | Status: SHIPPED | OUTPATIENT
Start: 2018-01-12 | End: 2018-01-22

## 2018-01-12 RX ORDER — ACETAMINOPHEN 325 MG/1
975 TABLET ORAL ONCE
Status: COMPLETED | OUTPATIENT
Start: 2018-01-12 | End: 2018-01-12

## 2018-01-12 RX ORDER — METOCLOPRAMIDE HYDROCHLORIDE 5 MG/ML
10 INJECTION INTRAMUSCULAR; INTRAVENOUS ONCE AS NEEDED
Status: DISCONTINUED | OUTPATIENT
Start: 2018-01-12 | End: 2018-01-12 | Stop reason: HOSPADM

## 2018-01-12 RX ORDER — FENTANYL CITRATE/PF 50 MCG/ML
50 SYRINGE (ML) INJECTION
Status: DISCONTINUED | OUTPATIENT
Start: 2018-01-12 | End: 2018-01-12 | Stop reason: HOSPADM

## 2018-01-12 RX ORDER — PROPOFOL 10 MG/ML
INJECTION, EMULSION INTRAVENOUS CONTINUOUS PRN
Status: DISCONTINUED | OUTPATIENT
Start: 2018-01-12 | End: 2018-01-12 | Stop reason: SURG

## 2018-01-12 RX ORDER — FENTANYL CITRATE/PF 50 MCG/ML
12.5 SYRINGE (ML) INJECTION
Status: DISCONTINUED | OUTPATIENT
Start: 2018-01-12 | End: 2018-01-12 | Stop reason: HOSPADM

## 2018-01-12 RX ORDER — LIDOCAINE HYDROCHLORIDE AND EPINEPHRINE 10; 10 MG/ML; UG/ML
INJECTION, SOLUTION INFILTRATION; PERINEURAL AS NEEDED
Status: DISCONTINUED | OUTPATIENT
Start: 2018-01-12 | End: 2018-01-12 | Stop reason: HOSPADM

## 2018-01-12 RX ADMIN — ACETAMINOPHEN 975 MG: 325 TABLET, FILM COATED ORAL at 09:12

## 2018-01-12 RX ADMIN — ROPIVACAINE HYDROCHLORIDE 20 ML: 2 INJECTION, SOLUTION EPIDURAL; INFILTRATION at 11:09

## 2018-01-12 RX ADMIN — PROPOFOL 100 MCG/KG/MIN: 10 INJECTION, EMULSION INTRAVENOUS at 11:25

## 2018-01-12 RX ADMIN — SODIUM CHLORIDE, SODIUM LACTATE, POTASSIUM CHLORIDE, AND CALCIUM CHLORIDE 125 ML/HR: .6; .31; .03; .02 INJECTION, SOLUTION INTRAVENOUS at 09:26

## 2018-01-12 RX ADMIN — GABAPENTIN 300 MG: 300 CAPSULE ORAL at 09:12

## 2018-01-12 RX ADMIN — MIDAZOLAM HYDROCHLORIDE 2 MG: 1 INJECTION, SOLUTION INTRAMUSCULAR; INTRAVENOUS at 11:09

## 2018-01-12 RX ADMIN — CEFAZOLIN SODIUM 2000 MG: 2 SOLUTION INTRAVENOUS at 11:22

## 2018-01-12 RX ADMIN — CLONIDINE 100 MCG: 100 INJECTION, SOLUTION EPIDURAL at 11:09

## 2018-01-12 RX ADMIN — SODIUM CHLORIDE, SODIUM LACTATE, POTASSIUM CHLORIDE, AND CALCIUM CHLORIDE 125 ML/HR: .6; .31; .03; .02 INJECTION, SOLUTION INTRAVENOUS at 15:50

## 2018-01-12 NOTE — OP NOTE
OPERATIVE REPORT  PATIENT NAME: Jaden Hicks    :  1955  MRN: [id number]  Pt Location:  OR ROOM 08    SURGERY DATE: 2018    Surgeon(s) and Role:     * Amberly Dickinson MD - Primary    Preop Diagnosis:  Avulsion of finger []    Post-Op Diagnosis Codes:     * Avulsion of finger []    Procedure(s) (LRB):  TENOLYSIS; FLEXOR TENDON REPAIR WITH PALMARIS LONGUS INTERPOSITION GRAFT; PALMARIS LONGUS HARVEST; APPLICATION OF SPLINT (Left)    Specimen(s):  * No specimens in log *    Estimated Blood Loss:   Minimal    Drains:       Anesthesia Type:   Choice    Operative Indications:  Avulsion of finger []  Left ring finger deep tendon avulsion in zone 2    Operative Findings:  3 cm tendon loss secondary to avulsion laceration of zone to deep tendon of ring finger, 3 cm of palmaris tendon harvested    Complications:   None    Procedure and Technique:  78-year-old male who sustained a left ring finger avulsion laceration involving the proximal volar portion of his ring finger which was repaired in the emergency department  He sustained a deep tendon laceration in zone 2 of the left ring finger  The presents today for repair of the zone 2 tendon laceration  Patient was identified the site was marked in preop holding  A left upper extremity nerve block was performed in the preop holding area  Patient was taken to the operating room where he was placed in supine position  After successful sedation, a tourniquet was placed in the left upper extremity  The left upper extremity was then prepped with ChloraPrep and draped in standard sterile fashion  A time-out was performed  The patient, site and procedures were verified  The left upper extremity was then exsanguinated with an Esmarch bandage  The tourniquet was then inflated to 250 mm of mercury  A Brunner incision was then made from the prior laceration and involving the proximal and middle phalanx volar surface   Sharp dissection was carried out to elevate the skin flap over the tendon sheath  Both nerves were identified as was the radial digital artery  The tendon sheath was then incised  The distal portion of the deep tendon was identified and tagged with a 2-0 Prolene suture  The proximal portion of the tendon was significantly frayed  There was a 3 cm segment of tendon loss  The palmaris tendon was then harvested from the volar forearm  The proximal and distal 2 cm transverse incision was made  The palmaris tendon was isolated  The tendon was sharply cut and removed  The volar incisions were then closed with interrupted horizontal mattress 3-0 nylon sutures  The tendon graft was then interposed proximally 1st with a 4 strand modified Durant stitch using 2-0 Prolene suture  An epitendinous suture was then placed in running fashion with a 4-0 Prolene suture  Next the distal portion of the tendon interposition was stitched using a modified 4 strand Durant repair, 2-0 Prolene suture  An epitendinous suture was also placed in running fashion with a 4-0 Prolene suture  Nerve wraps were then used to cover the tendon  Proximally with a 2 5 mm nerve wrap and distally with a 4 mm nerve wrap  This sheath was then reapproximated with interrupted 4-0 Prolene sutures  The skin was then closed with interrupted horizontal mattress 3-0 nylon sutures  Closure was dressed with bacitracin ointment and Xeroform  An extension blocking splint was then placed  The patient tolerated the procedure well and was taken to PACU in stable condition     A qualified resident physician was not available    Patient Disposition:  PACU     SIGNATURE: Balbina Rivera MD  DATE: January 12, 2018  TIME: 3:35 PM

## 2018-01-12 NOTE — RESULT NOTES
Discussion/Summary   all bw stable for him  continue current meds     Verified Results  (1) COMPREHENSIVE METABOLIC PANEL 28DXZ0077 85:70TQ Sridevi Alvarez Order Number: [id number]     Test Name Result Flag Reference   SODIUM 137 mmol/L  136-145   POTASSIUM 5 4 mmol/L H 3 5-5 3   CHLORIDE 105 mmol/L  100-108   CARBON DIOXIDE 27 mmol/L  21-32   ANION GAP (CALC) 5 mmol/L  4-13   BLOOD UREA NITROGEN 21 mg/dL  5-25   CREATININE 1 06 mg/dL  0 60-1 30   Standardized to IDMS reference method   CALCIUM 8 7 mg/dL  8 3-10 1   BILI, TOTAL 0 52 mg/dL  0 20-1 00   ALK PHOSPHATAS 65 U/L     ALT (SGPT) 35 U/L  12-78   AST(SGOT) 23 U/L  5-45   ALBUMIN 3 9 g/dL  3 5-5 0   TOTAL PROTEIN 7 0 g/dL  6 4-8 2   eGFR Non-African American      >60 0 ml/min/1 73sq [facility] Disease Education Program recommendations are as follows:  GFR calculation is accurate only with a steady state creatinine  Chronic Kidney disease less than 60 ml/min/1 73 sq  meters  Kidney failure less than 15 ml/min/1 73 sq  meters  GLUCOSE FASTING 107 mg/dL H 65-99     (1) HEMOGLOBIN A1C 39LKT4114 38:57FJ Sridevi Alvarez Order Number: [id number]     Test Name Result Flag Reference   HEMOGLOBIN A1C 6 7 % H 4 2-6 3   EST  AVG  GLUCOSE 146 mg/dl       (1) LIPID PANEL, FASTING 88UTX8004 23:60OL Sridevi [employer] Order Number: [id number]     Test Name Result Flag Reference   CHOLESTEROL 177 mg/dL     HDL,DIRECT 50 mg/dL  40-60   Specimen collection should occur prior to Metamizole administration due to the potential for falsely depressed results     LDL CHOLESTEROL CALCULATED 99 mg/dL  0-100   Triglyceride:         Normal              <150 mg/dl       Borderline High    150-199 mg/dl       High               200-499 mg/dl       Very High          >499 mg/dl  Cholesterol:         Desirable        <200 mg/dl      Borderline High  200-239 mg/dl      High             >239 mg/dl  HDL Cholesterol:        High    >59 mg/dL      Low     <41 mg/dL  LDL CALCULATED:    This screening LDL is a calculated result  It does not have the accuracy of the Direct Measured LDL in the monitoring of patients with hyperlipidemia and/or statin therapy  Direct Measure LDL (DHY381) must be ordered separately in these patients  TRIGLYCERIDES 140 mg/dL  <=150   Specimen collection should occur prior to N-Acetylcysteine or Metamizole administration due to the potential for falsely depressed results  (1) TSH 96XQN1522 39:38SP [employer] Order Number: [id number]     Test Name Result Flag Reference   TSH 1 680 uIU/mL  0 358-3 740   Patients undergoing fluorescein dye angiography may retain small amounts of fluorescein in the body for 48-72 hours post procedure  Samples containing fluorescein can produce falsely depressed TSH values  If the patient had this procedure,a specimen should be resubmitted post fluorescein clearance  (1) URIC ACID 95CGD8205 34:79XW Maryan Horanibal Order Number: [id number]     Test Name Result Flag Reference   URIC ACID 3 8 mg/dL L 4 2-8 0   Specimen collection should occur prior to Metamizole administration due to the potential for falsely depressed results

## 2018-01-12 NOTE — DISCHARGE INSTRUCTIONS
Hand Surgery Discharge Instructions  -Keep splint clean dry and intact until follow-up appointment  Place bag over splint to shower     -Hold left hand above level of heart at all times to decrease swelling and therefore pain     -Follow-up in 1 week for splint removal and suture removal     -Call my office at [phone] if you experience increased pain, swelling, redness, drainage or fever greater than 100 5°F

## 2018-01-12 NOTE — ANESTHESIA PROCEDURE NOTES
Peripheral Block    Patient location during procedure: holding area  Start time: 1/12/2018 11:09 AM  Reason for block: at surgeon's request and post-op pain management  Staffing  Anesthesiologist: Nathalie Lund  Performed: anesthesiologist   Preanesthetic Checklist  Completed: patient identified, site marked, surgical consent, pre-op evaluation, timeout performed, IV checked, risks and benefits discussed and monitors and equipment checked  Peripheral Block  Patient position: supine  Prep: ChloraPrep  Patient monitoring: continuous pulse ox and frequent blood pressure checks  Block type: supraclavicular  Laterality: left  Injection technique: single-shot  Procedures: ultrasound guided  ultrasound permanent image saved    Local infiltration: ropivacaine  Infiltration strength: 0 2 %  Dose: 20 mL  Needle  Needle type: Stimuplex   Needle length: 5 cm  Needle localization: ultrasound guidance  Test dose: negative  Assessment  Injection assessment: incremental injection, local visualized surrounding nerve on ultrasound, negative aspiration for CSF, negative aspiration for heme and no paresthesia on injection  Paresthesia pain: none  Heart rate change: no  Slow fractionated injection: yes  Post-procedure:  site cleaned  patient tolerated the procedure well with no immediate complications  Additional Notes  100mcg of clonidine added to block

## 2018-01-12 NOTE — ANESTHESIA PREPROCEDURE EVALUATION
Review of Systems/Medical History  Patient summary reviewed  Chart reviewed      Cardiovascular  Hyperlipidemia, Hypertension controlled,    Pulmonary  Negative pulmonary ROS ,        GI/Hepatic  Negative GI/hepatic ROS          Negative  ROS        Endo/Other  Diabetes type 2 ,   Comment: Gout   GYN       Hematology  Negative hematology ROS      Musculoskeletal  Obesity ,   Comment: Tear of left fourth digit flexor digitorum profundus      Neurology  Negative neurology ROS      Psychology   Negative psychology ROS            Physical Exam    Airway    Mallampati score: II  TM Distance: >3 FB  Neck ROM: full     Dental   No notable dental hx     Cardiovascular  Rhythm: regular, Rate: normal, Cardiovascular exam normal    Pulmonary  Pulmonary exam normal Breath sounds clear to auscultation,     Other Findings        Anesthesia Plan  ASA Score- 2     Anesthesia Type- IV sedation with anesthesia and regional with ASA Monitors  Additional Monitors:   Airway Plan:     Comment: Left supraclavicular block  Plan Factors-    Induction- intravenous  Postoperative Plan-     Informed Consent- Anesthetic plan and risks discussed with patient and spouse  I personally reviewed this patient with the CRNA  Discussed and agreed on the Anesthesia Plan with the CRNA  Rupali Godoy Recent labs personally reviewed:  Lab Results   Component Value Date    WBC 6 38 11/21/2017    HGB 14 8 11/21/2017     11/21/2017     Lab Results   Component Value Date     11/21/2017    K 4 8 11/21/2017    BUN 25 11/21/2017    CREATININE 1 09 11/21/2017    GLUCOSE 119 11/21/2016     Lab Results   Component Value Date    HGBA1C 6 7 (H) 11/21/2017       I, Danyel Lawson MD, have personally seen and evaluated the patient prior to anesthetic care  I have reviewed the pre-anesthetic record, and other medical records if appropriate to the anesthetic care    If a CRNA is involved in the case, I have reviewed the CRNA assessment, if present, and agree  Risks/benefits and alternatives discussed with patient including possible PONV, sore throat, and possibility of rare anesthetic and surgical emergencies

## 2018-01-12 NOTE — PERIOPERATIVE NURSING NOTE
Lt ring finger remains bluish in color, warm , refill < 2 sec, warm, dr Maria Del Rosario Avalos here assessing, some bruising attributing to discoloration

## 2018-01-12 NOTE — PROGRESS NOTES
Chief Complaint  Patient is here for a Hemoccult screening  Active Problems    1  Backache (724 5) (M54 9)   2  Bursitis (727 3) (M71 9)   3  Diverticulosis (562 10) (K57 90)   4  DM2 (diabetes mellitus, type 2) (250 00) (E11 9)   5  Esophageal reflux (530 81) (K21 9)   6  Gout (274 9) (M10 9)   7  Hordeolum internum (373 12) (H00 029)   8  Hyperlipidemia (272 4) (E78 5)   9  Hypertension (401 9) (I10)   10  Knee pain (719 46) (M25 569)   11  Need for prophylactic vaccination and inoculation against influenza (V04 81) (Z23)   12  Other abnormal finding of urine (791 9) (R82 99)   13  Paronychia of toe (681 11) (L03 039)   14  Prepatellar bursitis (726 65) (M70 40)    Current Meds   1  Benicar 40 MG Oral Tablet; TAKE ONE (1) TABLET daily; Therapy: 15MBW2455 to  Requested for: 33Hay3345 Recorded   2  Daily Multivitamin TABS; TAKE 1 TABLET DAILY; Therapy: (Recorded:19Ksr6689) to Recorded   3  Uloric 80 MG Oral Tablet; take 1 tablet by mouth every day; Therapy: 74MJZ4458 to (Last Rx:18Nov2016)  Requested for: 91RUY4790 Ordered   4  Zetia 10 MG Oral Tablet; TAKE 1 TABLET EVERY DAY; Therapy: 68AEZ8773 to (Evaluate:13Nov2017)  Requested for: 52GSA8031; Last   Rx:18Nov2016 Ordered    Allergies    1  No Known Drug Allergies    Plan   Hemoccult Screening - POC; Status:Resulted - Requires Verification,Retrospective By Protocol Authorization;   Done: 68ZLB3647 12:00AM  Due:88Hmj4114; Last Updated By:Paty Dejesus; 6/22/2017 10:30:44 AM;Ordered;    For:Health Maintenance; Ordered By:Jj Krishnamurthy; Future Appointments    Date/Time Provider Specialty Site   29/14/3469 37:25 AM CRESENCIO Trammell   Family Medicine [address] [place] [address]     Signatures   Electronically signed by : CRESENCIO Guzmán ; Jun 23 1640 10:33AM EST                       (Author)

## 2018-01-13 VITALS
DIASTOLIC BLOOD PRESSURE: 86 MMHG | HEIGHT: 70 IN | WEIGHT: 202.25 LBS | SYSTOLIC BLOOD PRESSURE: 140 MMHG | HEART RATE: 92 BPM | RESPIRATION RATE: 16 BRPM | TEMPERATURE: 98.2 F | BODY MASS INDEX: 28.95 KG/M2

## 2018-01-14 VITALS — BODY MASS INDEX: 28.63 KG/M2 | WEIGHT: 200 LBS | HEIGHT: 70 IN

## 2018-01-14 VITALS
DIASTOLIC BLOOD PRESSURE: 68 MMHG | HEIGHT: 70 IN | TEMPERATURE: 98 F | SYSTOLIC BLOOD PRESSURE: 120 MMHG | BODY MASS INDEX: 28.49 KG/M2 | HEART RATE: 90 BPM | WEIGHT: 199 LBS

## 2018-01-14 VITALS
TEMPERATURE: 98.3 F | HEIGHT: 70 IN | RESPIRATION RATE: 15 BRPM | WEIGHT: 203.5 LBS | HEART RATE: 72 BPM | DIASTOLIC BLOOD PRESSURE: 68 MMHG | BODY MASS INDEX: 29.13 KG/M2 | SYSTOLIC BLOOD PRESSURE: 140 MMHG

## 2018-01-15 NOTE — RESULT NOTES
Verified Results  (1) HEMOGLOBIN A1C 38XSD9029 86:58CY [facility] Order Number: [id number]   Order Number: [id number]     Test Name Result Flag Reference   HEMOGLOBIN A1C 6 5 % H 4 2-6 3   EST  AVG  GLUCOSE 140 mg/dl       (1) LIPID PANEL, FASTING 17DCS3916 20:52QU [facility] Order Number: [id number]   Order Number: [id number]     Test Name Result Flag Reference   CHOLESTEROL 193 mg/dL     HDL,DIRECT 57 mg/dL  40-60   Specimen collection should occur prior to Metamizole administration due to the potential for falsely depressed results  LDL CHOLESTEROL CALCULATED 104 mg/dL H 0-100   Triglyceride:         Normal              <150 mg/dl       Borderline High    150-199 mg/dl       High               200-499 mg/dl       Very High          >499 mg/dl  Cholesterol:         Desirable        <200 mg/dl      Borderline High  200-239 mg/dl      High             >239 mg/dl  HDL Cholesterol:        High    >59 mg/dL      Low     <41 mg/dL  LDL CALCULATED:    This screening LDL is a calculated result  It does not have the accuracy of the Direct Measured LDL in the monitoring of patients with hyperlipidemia and/or statin therapy  Direct Measure LDL (RLY707) must be ordered separately in these patients  TRIGLYCERIDES 162 mg/dL H <=150   Specimen collection should occur prior to N-Acetylcysteine or Metamizole administration due to the potential for falsely depressed results  Discussion/Summary   bw shows A1C stable at 6 5  cholest increased to 193  He should be more careful with high cholest foods   I will recheck bw on f/u ov

## 2018-01-16 NOTE — RESULT NOTES
Discussion/Summary   All recent blood work stable for patient  Continue current regimen of medications     Verified Results  (1) CBC/PLT/DIFF [id number] 20:91SA Liz Dear    Order Number: [id number]     Test Name Result Flag Reference   WBC COUNT 6 38 Thousand/uL  4 31-10 16   RBC COUNT 4 61 Million/uL  3 88-5 62   HEMOGLOBIN 14 8 g/dL  12 0-17 0   HEMATOCRIT 43 0 %  36 5-49 3   MCV 93 fL  82-98   MCH 32 1 pg  26 8-34 3   MCHC 34 4 g/dL  31 4-37 4   RDW 12 4 %  11 6-15 1   MPV 10 6 fL  8 9-12 7   PLATELET COUNT 442 Thousands/uL  149-390   nRBC AUTOMATED 0 /100 WBCs     NEUTROPHILS RELATIVE PERCENT 57 %  43-75   LYMPHOCYTES RELATIVE PERCENT 31 %  14-44   MONOCYTES RELATIVE PERCENT 9 %  4-12   EOSINOPHILS RELATIVE PERCENT 2 %  0-6   BASOPHILS RELATIVE PERCENT 1 %  0-1   NEUTROPHILS ABSOLUTE COUNT 3 62 Thousands/? ??L  1 85-7 62   LYMPHOCYTES ABSOLUTE COUNT 1 97 Thousands/? ??L  0 60-4 47   MONOCYTES ABSOLUTE COUNT 0 58 Thousand/? ??L  0 17-1 22   EOSINOPHILS ABSOLUTE COUNT 0 15 Thousand/? ??L  0 00-0 61   BASOPHILS ABSOLUTE COUNT 0 05 Thousands/? ??L  0 00-0 10     (1) COMPREHENSIVE METABOLIC PANEL [id number] 85:01OQ Alyssa Santiago Order Number: [id number]     Test Name Result Flag Reference   SODIUM 137 mmol/L  136-145   POTASSIUM 4 8 mmol/L  3 5-5 3   CHLORIDE 103 mmol/L  100-108   CARBON DIOXIDE 28 mmol/L  21-32   ANION GAP (CALC) 6 mmol/L  4-13   BLOOD UREA NITROGEN 25 mg/dL  5-25   CREATININE 1 09 mg/dL  0 60-1 30   Standardized to IDMS reference method   CALCIUM 9 0 mg/dL  8 3-10 1   BILI, TOTAL 0 61 mg/dL  0 20-1 00   ALK PHOSPHATAS 53 U/L     ALT (SGPT) 39 U/L  12-78   Specimen collection should occur prior to Sulfasalazine and/or Sulfapyridine administration due to the potential for falsely depressed results  AST(SGOT) 18 U/L  5-45   Specimen collection should occur prior to Sulfasalazine administration due to the potential for falsely depressed results     ALBUMIN 4 0 g/dL 3 5-5 0   TOTAL PROTEIN 7 3 g/dL  6 4-8 2   eGFR 71 ml/min/1 73sq m     National Kidney Disease Education Program recommendations are as follows:  GFR calculation is accurate only with a steady state creatinine  Chronic Kidney disease less than 60 ml/min/1 73 sq  meters  Kidney failure less than 15 ml/min/1 73 sq  meters  GLUCOSE FASTING 123 mg/dL H 65-99   Specimen collection should occur prior to Sulfasalazine administration due to the potential for falsely depressed results  Specimen collection should occur prior to Sulfapyridine administration due to the potential for falsely elevated results  (1) HEMOGLOBIN A1C [id number] 92:12HO Bart Shone Order Number: [id number]     Test Name Result Flag Reference   HEMOGLOBIN A1C 6 7 % H 4 2-6 3   EST  AVG  GLUCOSE 146 mg/dl       (1) LIPID PANEL, FASTING [id number] 71:04GY Bart Shone Order Number: [id number]     Test Name Result Flag Reference   CHOLESTEROL 171 mg/dL     HDL,DIRECT 45 mg/dL  40-60   Specimen collection should occur prior to Metamizole administration due to the potential for falsley depressed results  LDL CHOLESTEROL CALCULATED 86 mg/dL  0-100   Triglyceride:        Normal <150 mg/dl   Borderline High 150-199 mg/dl   High 200-499 mg/dl   Very High >499 mg/dl      Cholesterol:       Desirable <200 mg/dl    Borderline High 200-239 mg/dl    High >239 mg/dl      HDL Cholesterol:       High>59 mg/dL    Low <41 mg/dL      This screening LDL is a calculated result  It does not have the accuracy of the Direct Measured LDL in the monitoring of patients with hyperlipidemia and/or statin therapy  Direct Measure LDL (PAO361) must be ordered separately in these patients  TRIGLYCERIDES 199 mg/dL H <=150   Specimen collection should occur prior to N-Acetylcysteine or Metamizole administration due to the potential for falsely depressed results       (1) TSH [id number] 53:22TS Agata Cast    Order Number: [id number]     Test Name Result Flag Reference   TSH 1 760 uIU/mL  0 358-3 740   Patients undergoing fluorescein dye angiography may retain small amounts of fluorescein in the body for 48-72 hours post procedure  Samples containing fluorescein can produce falsely depressed TSH values  If the patient had this procedure,a specimen should be resubmitted post fluorescein clearance

## 2018-01-22 ENCOUNTER — GENERIC CONVERSION - ENCOUNTER (OUTPATIENT)
Dept: OTHER | Facility: OTHER | Age: 63
End: 2018-01-22

## 2018-01-22 ENCOUNTER — APPOINTMENT (OUTPATIENT)
Dept: OCCUPATIONAL THERAPY | Age: 63
End: 2018-01-22
Payer: MEDICARE

## 2018-01-22 VITALS
SYSTOLIC BLOOD PRESSURE: 138 MMHG | TEMPERATURE: 98.9 F | HEIGHT: 70 IN | BODY MASS INDEX: 29.56 KG/M2 | RESPIRATION RATE: 16 BRPM | HEART RATE: 72 BPM | WEIGHT: 206.5 LBS | DIASTOLIC BLOOD PRESSURE: 72 MMHG

## 2018-01-22 VITALS — BODY MASS INDEX: 28.63 KG/M2 | WEIGHT: 200 LBS | HEIGHT: 70 IN

## 2018-01-22 VITALS — WEIGHT: 200 LBS | HEIGHT: 70 IN | BODY MASS INDEX: 28.63 KG/M2

## 2018-01-22 DIAGNOSIS — S61.209A: ICD-10-CM

## 2018-01-22 PROCEDURE — 97165 OT EVAL LOW COMPLEX 30 MIN: CPT

## 2018-01-22 PROCEDURE — G8990 OTHER PT/OT CURRENT STATUS: HCPCS

## 2018-01-22 PROCEDURE — L3806 WHFO W/JOINT(S) CUSTOM FAB: HCPCS

## 2018-01-22 PROCEDURE — G8991 OTHER PT/OT GOAL STATUS: HCPCS

## 2018-01-24 VITALS — BODY MASS INDEX: 29.49 KG/M2 | WEIGHT: 206 LBS | HEIGHT: 70 IN

## 2018-01-24 VITALS — WEIGHT: 206 LBS | HEIGHT: 70 IN | BODY MASS INDEX: 29.49 KG/M2

## 2018-01-26 ENCOUNTER — OFFICE VISIT (OUTPATIENT)
Dept: PLASTIC SURGERY | Facility: CLINIC | Age: 63
End: 2018-01-26
Payer: MEDICARE

## 2018-01-26 VITALS — WEIGHT: 205 LBS | HEIGHT: 69 IN | BODY MASS INDEX: 30.36 KG/M2

## 2018-01-26 DIAGNOSIS — IMO0002 TENDON LACERATION: Primary | ICD-10-CM

## 2018-01-26 PROCEDURE — 99024 POSTOP FOLLOW-UP VISIT: CPT | Performed by: PHYSICIAN ASSISTANT

## 2018-01-26 RX ORDER — ERGOCALCIFEROL (VITAMIN D2) 10 MCG
1 TABLET ORAL DAILY
COMMUNITY

## 2018-01-26 RX ORDER — OLMESARTAN MEDOXOMIL 40 MG/1
1 TABLET ORAL DAILY
COMMUNITY
Start: 2015-01-28 | End: 2018-01-26 | Stop reason: SDUPTHER

## 2018-01-26 RX ORDER — FEBUXOSTAT 80 MG/1
1 TABLET, FILM COATED ORAL DAILY
COMMUNITY
Start: 2015-11-25 | End: 2018-01-26 | Stop reason: SDUPTHER

## 2018-01-26 RX ORDER — EZETIMIBE 10 MG/1
1 TABLET ORAL DAILY
COMMUNITY
Start: 2011-03-16 | End: 2018-01-26 | Stop reason: SDUPTHER

## 2018-01-26 NOTE — PROGRESS NOTES
26-year-old male status post avulsion laceration of his left ring finger    ; FLEXOR TENDON REPAIR WITH PALMARIS LONGUS INTERPOSITION GRAFT; PALMARIS LONGUS HARVEST; APPLICATION OF SPLINT on 1/12/18       Sutures were removed today without problems  Apply bacitracin to wound daily for 1 week  Continue to wear splint  Okay to remove for showering  Continue with physical therapy  Follow-up in 2 weeks

## 2018-01-26 NOTE — PATIENT INSTRUCTIONS
Apply bacitracin to wound daily for 1 week  Continue to wear splint  Okay to remove for showering  Continue with physical therapy  Follow-up in 2 weeks

## 2018-01-30 ENCOUNTER — OFFICE VISIT (OUTPATIENT)
Dept: OCCUPATIONAL THERAPY | Age: 63
End: 2018-01-30
Payer: MEDICARE

## 2018-01-30 DIAGNOSIS — S66.812A FLEXOR TENDON RUPTURE OF HAND, LEFT, INITIAL ENCOUNTER: Primary | ICD-10-CM

## 2018-01-30 PROCEDURE — 97110 THERAPEUTIC EXERCISES: CPT

## 2018-01-30 NOTE — PROGRESS NOTES
Daily Note     Today's date: 2018  Patient name: Laine Moreno  : 1955  MRN: [id number]  Referring provider: Tyron Moralez PA-C  Dx:   Encounter Diagnosis   Name Primary?  Flexor tendon rupture of hand, left, initial encounter Yes                  Subjective:  No comments      Objective: See treatment diary below  Precautions: FT protocol     Daily Treatment Diary   Reviewed HEP including PROM to ring finger, AAROM into a 1/2 fist,  Place/hold modified hook/fist position  Manual              Protected PROM to ring finger                                                                     Exercise Diary              Place hold modified hook and fist positions 30x            tenodesis             pegs Place in board                                                                                                                                                                                                                                             Modalities               15min                                              Assessment: Tolerated treatment well  Patient would benefit from continued OT  Required verbal cueing to perform home exercises correctly and not to actively squeeze too hard  Plan: Progress treament per protocol     Wound care,  TGE's

## 2018-02-01 ENCOUNTER — OFFICE VISIT (OUTPATIENT)
Dept: OCCUPATIONAL THERAPY | Age: 63
End: 2018-02-01
Payer: MEDICARE

## 2018-02-01 DIAGNOSIS — IMO0002 TENDON LACERATION: ICD-10-CM

## 2018-02-01 PROCEDURE — 97110 THERAPEUTIC EXERCISES: CPT

## 2018-02-01 PROCEDURE — 97530 THERAPEUTIC ACTIVITIES: CPT

## 2018-02-01 NOTE — PROGRESS NOTES
Daily Note     Today's date: 2018  Patient name: Belkis Sapp  : 1955  MRN: [id number]  Referring provider: Ana Vidal PA-C  Dx:   No diagnosis found  Subjective:  No comments      Objective: See treatment diary below  Precautions: FT protocol     Daily Treatment Diary   Reviewed HEP including PROM to ring finger, AAROM into a 1/2 fist,  Place/hold modified hook/fist position  Manual             Protected PROM to ring finger 10min 10min                                                                   Exercise Diary             Place hold modified hook and fist positions 30x 30x           tenodesis  30x           pegs Place in board                                                                                                                                                                                                                                             Modalities             MH 15min 15min                                       Spent 40 min  reviewing and practicing home exercises  Patient tends to actively flex to strong when doing exerises  Instructed to do place and hold feeling tension in the finger of 2/5  Incision still healing  Using gauze bandage to cover  Assessment: Tolerated treatment well  Patient would benefit from continued OT  Swelling of the digit        Plan: Progress treament per protocol     Wound care,  TGE's

## 2018-02-06 ENCOUNTER — OFFICE VISIT (OUTPATIENT)
Dept: OCCUPATIONAL THERAPY | Age: 63
End: 2018-02-06
Payer: MEDICARE

## 2018-02-06 DIAGNOSIS — S66.812A FLEXOR TENDON RUPTURE OF HAND, LEFT, INITIAL ENCOUNTER: Primary | ICD-10-CM

## 2018-02-06 PROCEDURE — 97110 THERAPEUTIC EXERCISES: CPT

## 2018-02-06 PROCEDURE — 97140 MANUAL THERAPY 1/> REGIONS: CPT

## 2018-02-08 ENCOUNTER — APPOINTMENT (OUTPATIENT)
Dept: OCCUPATIONAL THERAPY | Age: 63
End: 2018-02-08
Payer: MEDICARE

## 2018-02-09 ENCOUNTER — OFFICE VISIT (OUTPATIENT)
Dept: OCCUPATIONAL THERAPY | Age: 63
End: 2018-02-09
Payer: MEDICARE

## 2018-02-09 ENCOUNTER — OFFICE VISIT (OUTPATIENT)
Dept: PLASTIC SURGERY | Facility: CLINIC | Age: 63
End: 2018-02-09
Payer: MEDICARE

## 2018-02-09 VITALS — BODY MASS INDEX: 30.36 KG/M2 | WEIGHT: 205 LBS | HEIGHT: 69 IN

## 2018-02-09 DIAGNOSIS — S61.209D AVULSION OF FINGER, SUBSEQUENT ENCOUNTER: ICD-10-CM

## 2018-02-09 DIAGNOSIS — IMO0002 TENDON LACERATION: Primary | ICD-10-CM

## 2018-02-09 DIAGNOSIS — IMO0002 TENDON LACERATION: ICD-10-CM

## 2018-02-09 PROBLEM — S61.209A: Status: ACTIVE | Noted: 2017-10-13

## 2018-02-09 PROCEDURE — 97110 THERAPEUTIC EXERCISES: CPT

## 2018-02-09 PROCEDURE — G8985 CARRY GOAL STATUS: HCPCS

## 2018-02-09 PROCEDURE — 99024 POSTOP FOLLOW-UP VISIT: CPT | Performed by: PHYSICIAN ASSISTANT

## 2018-02-09 PROCEDURE — 97140 MANUAL THERAPY 1/> REGIONS: CPT

## 2018-02-09 PROCEDURE — G8984 CARRY CURRENT STATUS: HCPCS

## 2018-02-09 NOTE — PROGRESS NOTES
Daily Note     Today's date: 2018  Patient name: Pura Velázquez  : 1955  MRN: [id number]  Referring provider: Yue Cherry PA-C  Dx:   No diagnosis found  Subjective:  No comments      Objective: See treatment diary below  Hard eschar volar ring finger  Applied aquaphor to area and attempted to remove but still too adhered  instruced in gentle passive finger extension to back of the splint  Patients finger developing flexion contracture  Precautions: FT protocol - 4 wks    Daily Treatment Diary        Manual           Protected PROM to ring finger 10min 10min 10min 10min         Scar massage   5min 5min                                                    Exercise Diary           Place hold modified hook and fist positions 30x 30x 30x Full fist and hook          tenodesis  30x 30x 50x         pegs Place in board            Pennies- static    performed peformed         Medicine balls    performed                                                                                                                                                                                                                Modalities             MH 15min 15min                                           Assessment: Tolerated treatment well  Patient would benefit from continued OT  Swelling of the digit        Plan: Progress treament per protocol     Wound care,  TGE's

## 2018-02-09 NOTE — PROGRESS NOTES
Office Note - Plastic Surgery   Jaden Hicks    [id number]      Subjective:       Jaden Hicks is a 70 y o  male who presents today for wound check  HPI Patient has a surgical incision wound which is located on the left hand  Ring finger  Current symptoms: wound healing as expected  Pain is rated 0/10  Objective:      Ht 5' 9" (1 753 m)   Wt 93 kg (205 lb)   BMI 30 27 kg/m²     Wound:   wound margins intact and healing well  No signs of infection  appears improved compared to last recheck        Assessment:      Wound check  Incision with scab  No purulence or drainage  Care provided included visual inspection     Plan:      1  Discussed appropriate home care of this wound  2  Patient instructions were given  Wound dressing with Aquaphor   3  Follow up: 2 months  4, Continue with OT                       Current Outpatient Prescriptions:     ezetimibe (ZETIA) 10 mg tablet, Take 10 mg by mouth daily, Disp: , Rfl:     febuxostat (ULORIC) 80 MG TABS, Take 80 mg by mouth daily, Disp: , Rfl:     Multiple Vitamin (DAILY VALUE MULTIVITAMIN) TABS, Take 1 tablet by mouth daily, Disp: , Rfl:     naproxen (EC NAPROSYN) 500 MG EC tablet, Take 1 tablet by mouth 2 (two) times a day with meals for 30 days, Disp: 60 tablet, Rfl: 0    olmesartan (BENICAR) 40 mg tablet, Take 40 mg by mouth daily, Disp: , Rfl:

## 2018-02-13 ENCOUNTER — OFFICE VISIT (OUTPATIENT)
Dept: OCCUPATIONAL THERAPY | Age: 63
End: 2018-02-13
Payer: MEDICARE

## 2018-02-13 DIAGNOSIS — S66.812A FLEXOR TENDON RUPTURE OF HAND, LEFT, INITIAL ENCOUNTER: Primary | ICD-10-CM

## 2018-02-13 PROCEDURE — 97140 MANUAL THERAPY 1/> REGIONS: CPT

## 2018-02-13 PROCEDURE — 97110 THERAPEUTIC EXERCISES: CPT

## 2018-02-13 NOTE — PROGRESS NOTES
Daily Note     Today's date: 2018  Patient name: Dominga Almaraz  : 1955  MRN: [id number]  Referring provider: Eveline Pruitt PA-C  Dx:   Encounter Diagnosis   Name Primary?  Flexor tendon rupture of hand, left, initial encounter Yes                  Subjective:  No comments      Objective: See treatment diary below  Precautions: FT protocol - 5 wks    Daily Treatment Diary        Manual          Protected PROM to ring finger 10min 10min 10min 10min 10min        Scar massage   5min 5min 5min                                                   Exercise Diary          Place hold modified hook and fist positions 30x 30x 30x Full fist and hook  completed        tenodesis  30x 30x 50x 50x        pegs Place in board            Pennies- static    performed peformed         Medicine balls    performed performed        Static      Dowel and soft putty        Towel crunches     3x10                                                                                                                                                                                     Modalities            MH 15min 15min 15min                                          Assessment: Tolerated treatment well  Patient would benefit from continued OT  Swelling of the digit        Plan: Progress treament per protocol     Wound care,  TGE's

## 2018-02-15 ENCOUNTER — APPOINTMENT (OUTPATIENT)
Dept: OCCUPATIONAL THERAPY | Age: 63
End: 2018-02-15
Payer: MEDICARE

## 2018-02-16 ENCOUNTER — OFFICE VISIT (OUTPATIENT)
Dept: OCCUPATIONAL THERAPY | Age: 63
End: 2018-02-16
Payer: MEDICARE

## 2018-02-16 DIAGNOSIS — S66.812A FLEXOR TENDON RUPTURE OF HAND, LEFT, INITIAL ENCOUNTER: Primary | ICD-10-CM

## 2018-02-16 PROCEDURE — 97110 THERAPEUTIC EXERCISES: CPT

## 2018-02-16 PROCEDURE — 97140 MANUAL THERAPY 1/> REGIONS: CPT

## 2018-02-16 NOTE — PROGRESS NOTES
Daily Note     Today's date: 2018  Patient name: Rosie Dumont  : 1955  MRN: [id number]  Referring provider: Milton Bazzi PA-C  Dx:   Encounter Diagnosis   Name Primary?  Flexor tendon rupture of hand, left, initial encounter Yes                  Subjective:  No comments      Objective: See treatment diary below  Precautions: FT protocol - 5 wks    Daily Treatment Diary        Manual  01/30 2/01 02/06 2/9 2/13 2/15       Protected PROM to ring finger 10min 10min 10min 10min 10min 10min       Scar massage   5min 5min 5min        Ext stretching      3min       Blocking to DIP      2min                        Exercise Diary  1/30 2/1 02/06 2/9 2/13 2/15       Place hold modified hook and fist positions 30x 30x 30x Full fist and hook  completed completed       tenodesis  30x 30x 50x 50x        pegs Place in board            Pennies- static    performed peformed         Medicine balls    performed performed performed       Static      Dowel and soft putty completed       Towel crunches     3x10                                                                                                                                                                                     Modalities            MH 15min 15min 15min                                          Assessment: Tolerated treatment well  Patient would benefit from continued OT  Swelling of the digit        Plan: Progress treament per protocol     Wound care,  TGE's

## 2018-02-20 ENCOUNTER — OFFICE VISIT (OUTPATIENT)
Dept: OCCUPATIONAL THERAPY | Age: 63
End: 2018-02-20
Payer: MEDICARE

## 2018-02-20 DIAGNOSIS — S66.812A FLEXOR TENDON RUPTURE OF HAND, LEFT, INITIAL ENCOUNTER: Primary | ICD-10-CM

## 2018-02-20 PROCEDURE — 97110 THERAPEUTIC EXERCISES: CPT

## 2018-02-20 PROCEDURE — 97140 MANUAL THERAPY 1/> REGIONS: CPT

## 2018-02-20 PROCEDURE — 97022 WHIRLPOOL THERAPY: CPT

## 2018-02-20 NOTE — PROGRESS NOTES
Daily Note     Today's date: 2018  Patient name: Lakhwinder Crump  : 1955  MRN: [id number]  Referring provider: Lauryn Lacey PA-C  Dx:   Encounter Diagnosis   Name Primary?  Flexor tendon rupture of hand, left, initial encounter Yes                  Subjective:  No comments      Objective: See treatment diary below  Precautions: FT protocol - 5 5wks,  Daily Treatment Diary    Instructed patient in passive finger extension  Unable to make a complete hook or fist   FDP tendon gliding  D/c splint next visit  Manual  01/30 2/01 02/06 2/9 2/13 2/15 2/20      Protected PROM to ring finger 10min 10min 10min 10min 10min 10min 10min      Scar massage   5min 5min 5min  5min      Ext stretching      3min       Blocking to DIP      2min                        Exercise Diary  1/30 2/1 02/06 2/9 2/13 2/15 2/20      Active hook and fist 30x 30x 30x Full fist and hook  completed completed Active hook      tenodesis  30x 30x 50x 50x  D/c      pegs Place in board            Pennies- static    performed peformed         Medicine balls    performed performed performed       Static      Dowel and soft putty completed Light hammer sup/pron      Towel crunches     3x10  3x10      rubberbands       completed      gripping       Dowel and soft putty                                                                                                                                                         Modalities           MH 15min 15min 15min 15min                                         Assessment: Tolerated treatment well  Patient would benefit from continued OT  Swelling of the digit        Plan: Progress treament per protocol     Wound care,  TGE's

## 2018-02-22 ENCOUNTER — APPOINTMENT (OUTPATIENT)
Dept: OCCUPATIONAL THERAPY | Age: 63
End: 2018-02-22
Payer: MEDICARE

## 2018-02-23 ENCOUNTER — OFFICE VISIT (OUTPATIENT)
Dept: OCCUPATIONAL THERAPY | Age: 63
End: 2018-02-23
Payer: MEDICARE

## 2018-02-23 DIAGNOSIS — S66.812A FLEXOR TENDON RUPTURE OF HAND, LEFT, INITIAL ENCOUNTER: Primary | ICD-10-CM

## 2018-02-23 PROCEDURE — 97140 MANUAL THERAPY 1/> REGIONS: CPT

## 2018-02-23 PROCEDURE — 97110 THERAPEUTIC EXERCISES: CPT

## 2018-02-23 NOTE — PROGRESS NOTES
Daily Note     Today's date: 2018  Patient name: Dominga Almaraz  : 1955  MRN: [id number]  Referring provider: Eveline Pruitt PA-C  Dx:   Encounter Diagnosis   Name Primary?  Flexor tendon rupture of hand, left, initial encounter Yes                  Subjective:  No comments      Objective: See treatment diary below  Precautions: FT protocol - 6wks    Daily Treatment Diary  Splint discontinued today  Continue to promote tendon glide function  Mild flexion contracture  Unable to make a tight fist     Manual  01/30 2/01 02/06 2/9 2/13 2/15 2/20 02/23     Protected PROM to ring finger 10min 10min 10min 10min 10min 10min 10min      Scar massage   5min 5min 5min  5min 5min     Ext stretching      3min       Blocking to DIP      2min       Passive PIP ext        5min         Exercise Diary  1/30 2/1 02/06 2/9 2/13 2/15 2/20 2/23     Active hook and fist 30x 30x 30x Full fist and hook  completed completed Active hook completed     tenodesis  30x 30x 50x 50x  D/c      pegs Place in board            [facility] static    performed peformed         Medicine balls    performed performed performed  performed     Static      Dowel and soft putty completed Light hammer sup/pron Light hammer sup/pron     Towel crunches     3x10  3x10 3x10     rubberbands       completed completed     gripping       Dowel and soft putty Dowel and soft putty     gripper        setting 1 3 x10                                                                                                                                           Modalities          MH 15min 15min 15min 15min 15min                                        Assessment: Tolerated treatment well  Patient would benefit from continued OT      Plan: Progress treament per protocol     Wound care,  TGE's, passive PIP extension, scar massage

## 2018-02-27 ENCOUNTER — OFFICE VISIT (OUTPATIENT)
Dept: OCCUPATIONAL THERAPY | Age: 63
End: 2018-02-27
Payer: MEDICARE

## 2018-02-27 DIAGNOSIS — S66.812A FLEXOR TENDON RUPTURE OF HAND, LEFT, INITIAL ENCOUNTER: Primary | ICD-10-CM

## 2018-02-27 PROCEDURE — 97110 THERAPEUTIC EXERCISES: CPT

## 2018-02-27 PROCEDURE — 97140 MANUAL THERAPY 1/> REGIONS: CPT

## 2018-02-27 NOTE — PROGRESS NOTES
Daily Note     Today's date: 2018  Patient name: Laine Moreno  : 1955  MRN: [id number]  Referring provider: Tyron Moralez PA-C  Dx:   Encounter Diagnosis   Name Primary?  Flexor tendon rupture of hand, left, initial encounter Yes                  Subjective:  No comments      Objective: See treatment diary below  Precautions: FT protocol - 7wks    Daily Treatment Diary      Manual  01/30 2/01 02/06 2/9 2/13 2/15 2/20 02/23 2/27    protected PROM to ring finger 10min 10min 10min 10min 10min 10min 10min  Full PROM 5min    Scar massage   5min 5min 5min  5min 5min 5min    Ext stretching      3min       Blocking to DIP      2min   5min    Passive PIP ext        5min 5min        Exercise Diary  1/30 2/1 02/06 2/9 2/13 2/15 2/20 2/23 2/27    Active hook and fist 30x 30x 30x Full fist and hook  completed completed Active hook completed     tenodesis  30x 30x 50x 50x  D/c      pegs Place in board            Pennies- static    performed peformed         Medicine balls    performed performed performed  performed     Static      Dowel and soft putty completed Light hammer sup/pron Light hammer sup/pron     Towel crunches     3x10  3x10 3x10     rubberbands       completed completed     gripping       Dowel and soft putty Dowel and soft putty     gripper        setting 1 3 x10     Resistive gripping         YPW 5x10                                                                                                                             Modalities         MH 15min 15min 15min 15min 15min 15min                                       Assessment: Tolerated treatment well  Patient would benefit from continued OT      Plan: Progress treament per protocol     Wound care,  TGE's, passive PIP extension, scar massage

## 2018-03-02 ENCOUNTER — OFFICE VISIT (OUTPATIENT)
Dept: OCCUPATIONAL THERAPY | Age: 63
End: 2018-03-02
Payer: MEDICARE

## 2018-03-02 DIAGNOSIS — S66.812A FLEXOR TENDON RUPTURE OF HAND, LEFT, INITIAL ENCOUNTER: Primary | ICD-10-CM

## 2018-03-02 PROCEDURE — 97110 THERAPEUTIC EXERCISES: CPT

## 2018-03-02 NOTE — PROGRESS NOTES
Daily Note     Today's date: 3/2/2018  Patient name: Woodrow Mayberry  : 1955  MRN: [id number]  Referring provider: Jorje Mayfield PA-C  Dx:   Encounter Diagnosis   Name Primary?  Flexor tendon rupture of hand, left, initial encounter Yes                  Subjective:  No comments      Objective: See treatment diary below  Precautions: FT protocol - 7wks  Treated with another patient for 15min    Daily Treatment Diary      Manual  01/30 2/01 02/06 2/9 2/13 2/15 2/20 02/23 2/27 3/2   protected PROM to ring finger 10min 10min 10min 10min 10min 10min 10min  Full PROM 5min    Scar massage   5min 5min 5min  5min 5min 5min    Ext stretching      3min       Blocking to DIP      2min   5min    Passive PIP ext        5min 5min 5min       Exercise Diary  1/30 2/1 02/06 2/9 2/13 2/15 2/20 2/23 2/27 3/2   Active hook and fist 30x 30x 30x Full fist and hook  completed completed Active hook completed  3x10   tenodesis  30x 30x 50x 50x  D/c      pegs Place in board            Pennies- static    performed peformed         Medicine balls    performed performed performed  performed  performed   Static      Dowel and soft putty completed Light hammer sup/pron Light hammer sup/pron  yellow hammer 3 x 10   Towel crunches     3x10  3x10 3x10     rubberbands       completed completed  completed   gripping       Dowel and soft putty Dowel and soft putty  completed   gripper        setting 1 3 x10     Resistive gripping         YPW 5x10 [id number]   Wrist PRE's          2# 3x10                                                                                                               Modalities         MH 15min 15min 15min 15min 15min 15min                                       Assessment: Tolerated treatment well  Patient would benefit from continued OT      Plan: Progress treament per protocol     Wound care,  TGE's, passive PIP extension, scar massage

## 2018-03-06 ENCOUNTER — OFFICE VISIT (OUTPATIENT)
Dept: OCCUPATIONAL THERAPY | Age: 63
End: 2018-03-06
Payer: MEDICARE

## 2018-03-06 DIAGNOSIS — S66.812A FLEXOR TENDON RUPTURE OF HAND, LEFT, INITIAL ENCOUNTER: Primary | ICD-10-CM

## 2018-03-06 PROCEDURE — G8984 CARRY CURRENT STATUS: HCPCS

## 2018-03-06 PROCEDURE — 97168 OT RE-EVAL EST PLAN CARE: CPT

## 2018-03-06 PROCEDURE — 97140 MANUAL THERAPY 1/> REGIONS: CPT

## 2018-03-06 PROCEDURE — G8985 CARRY GOAL STATUS: HCPCS

## 2018-03-06 NOTE — PROGRESS NOTES
OT Re-Evaluation     Today's date: 3/6/2018  Patient name: Amie Mena  : 1955  MRN: [id number]  Referring provider: Yuni Saba PA-C  Dx:   Encounter Diagnosis     ICD-10-CM    1  Flexor tendon rupture of hand, left, initial encounter [id number] [id number]                   Assessment    Assessment details: Patient  Is using his left hand for light ADL's  He is no longer wearing an orthosis  He has a mild flexion contracture and is unable to make a tight fist    Understanding of Dx/Px/POC: good   Prognosis: good    Plan  Patient would benefit from: skilled OT  Planned modality interventions: thermotherapy: hydrocollator packs  Planned therapy interventions: strengthening, stretching and home exercise program  Frequency: 2x week  Duration in weeks: 4        Subjective Evaluation    History of Present Illness  Mechanism of injury: S/p left ring FDP tendon repair  Patient approximately 7wks post surgery     Quality of life: good    Pain  Current pain ratin  At best pain ratin  At worst pain ratin  Quality: discomfort  Progression: improved          Objective     Active Range of Motion     Right Digits   Flexion   Ring     MCP: 90    PIP: 75    DIP: 35  Extension   Ring     PIP: 15    DIP: 20          Precautions:  none    Daily Treatment Diary      Manual  3/6 2/01 02/06 2/9 2/13 2/15 2/20 02/23 2/27 3/2   protected PROM to ring finger  10min 10min 10min 10min 10min 10min   Full PROM 5min     Scar massage 5min   5min 5min 5min   5min 5min 5min     Ext stretching 3min         3min           Blocking to DIP 2min         2min     5min     Passive PIP ext 5min             5min 5min 5min         Exercise Diary  3/6 2/1 02/06 2/9 2/13 2/15 2/20 2/23 2/27 3/2   Active hook and fist  30x 30x Full fist and hook  completed completed Active hook completed   3x10   tenodesis  30x 30x 50x 50x   D/c         pegs                      Pennies- static     performed peformed               Medicine balls completed     performed performed performed   performed   performed   Static         Dowel and soft putty completed Light hammer sup/pron Light hammer sup/pron   yellow hammer 3 x 10   Towel crunches        3x10   3x10 3x10       rubberbands completed           completed completed   completed   gripping            Dowel and soft putty Dowel and soft putty   completed   gripper              setting 1 3 x10       Resistive gripping [id number]               YPW 5x10 [id number]   Wrist PRE's 2#3x10                 2# 3x10                                                                                                                                                                                                 Modalities  1/30 02/01 2/13 2/20 2/23 2/27            15min 15min 15min 15min 15min 15min

## 2018-03-09 ENCOUNTER — OFFICE VISIT (OUTPATIENT)
Dept: OCCUPATIONAL THERAPY | Age: 63
End: 2018-03-09
Payer: MEDICARE

## 2018-03-09 DIAGNOSIS — S66.812A FLEXOR TENDON RUPTURE OF HAND, LEFT, INITIAL ENCOUNTER: Primary | ICD-10-CM

## 2018-03-09 PROCEDURE — 97140 MANUAL THERAPY 1/> REGIONS: CPT

## 2018-03-09 PROCEDURE — 97110 THERAPEUTIC EXERCISES: CPT

## 2018-03-09 NOTE — PROGRESS NOTES
Daily Note     Today's date: 3/9/2018  Patient name: Eden Logan  : 1955  MRN: [id number]  Referring provider: Roman Gonzalez PA-C  Dx:   Encounter Diagnosis     ICD-10-CM    1   Flexor tendon rupture of hand, left, initial encounter S66 812A                   Subjective: no comment      Objective: See treatment diary below    Manual  3/6 3/8 02/06 2/9 2/13 2/15 2/20 02/23 2/27 32   protected PROM to ring finger    10min 10min 10min 10min 10min   Full PROM 5min     Scar massage 5min  5min 5min 5min   5min 5min 5min     Ext stretching 3min 3min       3min           Blocking to DIP 2min 2min       2min     5min     Passive PIP ext 5min 5min           5min 5min 5min         Exercise Diary  3/6 3/8 02/06 2/9 2/13 2/15 2/20 2/23 2/27 32   Active hook and fist    30x Full fist and hook  completed completed Active hook completed   3x10   tenodesis    30x 50x 50x   D/c         pegs                      Pennies- static     performed peformed               Medicine balls completed    performed performed performed   performed   performed   Static         Dowel and soft putty completed Light hammer sup/pron Light hammer sup/pron   yellow hammer 3 x 10   Towel crunches        3x10   3x10 3x10       rubberbands completed completed         completed completed   completed   gripping            Dowel and soft putty Dowel and soft putty   completed   gripper   setting 1 ,colored pegs           setting 1 3 x10       Resistive gripping [id number]             YPW 5x10 [id number]   Wrist PRE's 2#3x10 3#3x10               2# 3x10    power bar   Red-twist 3x10                                                                                                                                                                                          Modalities    3/8         MH 15min 15min 15min 15min 15min 15min  15min                                                             Assessment: Tolerated treatment well  Patient would benefit from continued OT      Plan: Progress treatment as tolerated

## 2018-03-13 ENCOUNTER — OFFICE VISIT (OUTPATIENT)
Dept: OCCUPATIONAL THERAPY | Age: 63
End: 2018-03-13
Payer: MEDICARE

## 2018-03-13 DIAGNOSIS — S66.812A FLEXOR TENDON RUPTURE OF HAND, LEFT, INITIAL ENCOUNTER: Primary | ICD-10-CM

## 2018-03-13 PROCEDURE — 97110 THERAPEUTIC EXERCISES: CPT

## 2018-03-13 PROCEDURE — 97140 MANUAL THERAPY 1/> REGIONS: CPT

## 2018-03-13 NOTE — PROGRESS NOTES
Daily Note     Today's date: 3/13/2018  Patient name: Pura Velázquez  : 1955  MRN: [id number]  Referring provider: Yue Cherry PA-C  Dx:   Encounter Diagnosis     ICD-10-CM    1  Flexor tendon rupture of hand, left, initial encounter S66 812A                   Subjective: no comment      Objective: See treatment diary below    Manual  3/6 3/8 3/13          protected PROM to ring finger              Scar massage 5min  5min          Ext stretching 3min 3min 5min          Blocking to DIP 2min 2min  5min          Passive PIP ext 5min 5min 5min                Exercise Diary  3/6 3/8 3/12 2/9 2/13 2/15 2/20 2/23 2/27 3/2   Active hook and fist    YPW for active hook Full fist and hook  completed completed Active hook completed   3x10   tenodesis     50x 50x   D/c         pegs                     Pennies- static      peformed               Medicine balls completed   performed performed performed   performed   performed   Static     Yellow hammer 3 x10   Dowel and soft putty completed Light hammer sup/pron Light hammer sup/pron   yellow hammer 3 x 10   Towel crunches       3x10   3x10 3x10       rubberbands completed completed completed       completed completed   completed   gripping           Dowel and soft putty Dowel and soft putty   completed   gripper   setting 1 ,colored pegs          setting 1 3 x10       Resistive gripping [id number]           YPW 5x10 [id number]   Wrist PRE's 2#3x10 3#3x10 3#3x10             2# 3x10    power bar   Red-twist 3x10 Red- twist 3x10                                                                                                                                                                                 Modalities  1/30 02/01 2/13 2/20 2/23 2/27  3/8          15min 15min 15min 15min 15min 15min  15min                                                             Assessment: Tolerated treatment well   Patient would benefit from continued OT      Plan: Progress treatment as tolerated

## 2018-03-14 DIAGNOSIS — I10 HYPERTENSION, UNSPECIFIED TYPE: ICD-10-CM

## 2018-03-14 DIAGNOSIS — E78.5 HYPERLIPIDEMIA, UNSPECIFIED HYPERLIPIDEMIA TYPE: ICD-10-CM

## 2018-03-14 DIAGNOSIS — M10.9 GOUT, UNSPECIFIED CAUSE, UNSPECIFIED CHRONICITY, UNSPECIFIED SITE: Primary | ICD-10-CM

## 2018-03-14 RX ORDER — FEBUXOSTAT 80 MG/1
80 TABLET ORAL DAILY
Qty: 90 TABLET | Refills: 2 | Status: SHIPPED | OUTPATIENT
Start: 2018-03-14

## 2018-03-14 RX ORDER — OLMESARTAN MEDOXOMIL 40 MG/1
40 TABLET ORAL DAILY
Qty: 90 TABLET | Refills: 2 | Status: SHIPPED | OUTPATIENT
Start: 2018-03-14

## 2018-03-14 RX ORDER — EZETIMIBE 10 MG/1
10 TABLET ORAL DAILY
Qty: 90 TABLET | Refills: 2 | Status: SHIPPED | OUTPATIENT
Start: 2018-03-14

## 2018-03-16 ENCOUNTER — OFFICE VISIT (OUTPATIENT)
Dept: OCCUPATIONAL THERAPY | Age: 63
End: 2018-03-16
Payer: MEDICARE

## 2018-03-16 DIAGNOSIS — S66.812A FLEXOR TENDON RUPTURE OF HAND, LEFT, INITIAL ENCOUNTER: Primary | ICD-10-CM

## 2018-03-16 PROCEDURE — 97140 MANUAL THERAPY 1/> REGIONS: CPT

## 2018-03-16 PROCEDURE — 97110 THERAPEUTIC EXERCISES: CPT

## 2018-03-19 ENCOUNTER — OFFICE VISIT (OUTPATIENT)
Dept: OCCUPATIONAL THERAPY | Age: 63
End: 2018-03-19
Payer: MEDICARE

## 2018-03-19 DIAGNOSIS — S66.812A FLEXOR TENDON RUPTURE OF HAND, LEFT, INITIAL ENCOUNTER: Primary | ICD-10-CM

## 2018-03-19 PROCEDURE — 97140 MANUAL THERAPY 1/> REGIONS: CPT

## 2018-03-19 PROCEDURE — 97110 THERAPEUTIC EXERCISES: CPT

## 2018-03-19 NOTE — ED ATTENDING ATTESTATION
Gabrielle Guajardo DO, saw and evaluated the patient  I have discussed the patient with the resident/non-physician practitioner and agree with the resident's/non-physician practitioner's findings, Plan of Care, and MDM as documented in the resident's/non-physician practitioner's note, except where noted  All available labs and Radiology studies were reviewed  At this point I agree with the current assessment done in the Emergency Department  I have conducted an independent evaluation of this patient a history and physical is as follows:      57-year-old male presents for evaluation of injury to the left ring finger after he fell off of a ladder having his ring on the ladder as he fell  Patient complains of 8/10 pain to the left ring finger constant since onset, nonradiating, worse with palpation  Patient has subjective numbness distal to the site of injury  There is a gold ring present which is deformed and somewhat oval shaped along with a moderate amount of tissue destruction to the left ring finger consistent with a ring avulsion injury  Unknown last tetanus  Impression:  Ring avulsion injury plan:  Digital block, copious irrigation, update tetanus, films, hand surgery consult        Critical Care Time  CritCare Time detailed exam negative

## 2018-03-19 NOTE — PROGRESS NOTES
Daily Note     Today's date: 3/19/2018  Patient name: Laine Moreno  : 1955  MRN: [id number]  Referring provider: Tyron Moralez PA-C  Dx:   Encounter Diagnosis     ICD-10-CM    1  Flexor tendon rupture of hand, left, initial encounter S66 812A                   Subjective: no comment      Objective: See treatment diary below    Manual  3/6 3/8 3/13 3/16 3/19        protected PROM to ring finger              Scar massage 5min  5min 5min         Ext stretching 3min 3min 5min 5min 5min        Blocking to DIP 2min 2min  5min 5min 5min        Passive PIP ext 5min 5min 5min 5min 5min              Exercise Diary  3/6 3/8 3/12 3/16 3/19        Active hook and fist    YPW for active hook YPW for hook fist YPW hook fist        tenodesis              pegs              Pennies- static               Medicine balls completed            Static     Yellow hammer 3 x10 Yellow hammer 3x10 Yellow hammer 3x10        Towel crunches              rubberbands completed completed completed completed completed        gripping              gripper   setting 1 ,colored pegs           Resistive gripping [id number]  RPW 3 x10  [id number]        Wrist PRE's 2#3x10 3#3x10 3#3x10  3#3x10  3#3x10          power bar   Red-twist 3x10 Red- twist 3x10  red- twist 3 x10  red-twist 3x10                                                                                                                                             Modalities  1/30 02/01 2/13 2/20 2/23 2/27  3/8  3/16  3/19     MH 15min 15min 15min 15min 15min 15min  15min  15min  15min                                                         Assessment: Tolerated treatment well  Patient would benefit from continued OT      Plan: Progress treatment as tolerated

## 2018-03-21 ENCOUNTER — APPOINTMENT (OUTPATIENT)
Dept: OCCUPATIONAL THERAPY | Age: 63
End: 2018-03-21
Payer: MEDICARE

## 2018-03-22 ENCOUNTER — APPOINTMENT (OUTPATIENT)
Dept: OCCUPATIONAL THERAPY | Age: 63
End: 2018-03-22
Payer: MEDICARE

## 2018-03-23 ENCOUNTER — APPOINTMENT (OUTPATIENT)
Dept: OCCUPATIONAL THERAPY | Age: 63
End: 2018-03-23
Payer: MEDICARE

## 2018-03-27 ENCOUNTER — APPOINTMENT (OUTPATIENT)
Dept: OCCUPATIONAL THERAPY | Age: 63
End: 2018-03-27
Payer: MEDICARE

## 2018-03-30 ENCOUNTER — APPOINTMENT (OUTPATIENT)
Dept: OCCUPATIONAL THERAPY | Age: 63
End: 2018-03-30
Payer: MEDICARE

## 2021-05-28 NOTE — PROGRESS NOTES
Daily Note     Today's date: 3/16/2018  Patient name: Yessica Cordero  : 1955  MRN: [id number]  Referring provider: Luanne Sanabria PA-C  Dx:   Encounter Diagnosis     ICD-10-CM    1  Flexor tendon rupture of hand, left, initial encounter S66 812A                   Subjective: no comment      Objective: See treatment diary below    Manual  3/6 3/8 3/13 3/16         protected PROM to ring finger              Scar massage 5min  5min 5min         Ext stretching 3min 3min 5min 5min         Blocking to DIP 2min 2min  5min 5min         Passive PIP ext 5min 5min 5min 5min               Exercise Diary  3/6 3/8 3/12 3/16   2/20 2/23 2/27 3/2   Active hook and fist    YPW for active hook YPW for hook fist   Active hook completed   3x10   tenodesis        D/c         pegs                  Pennies- static                   Medicine balls completed        performed   performed   Static     Yellow hammer 3 x10 Yellow hammer 3x10   Light hammer sup/pron Light hammer sup/pron   yellow hammer 3 x 10   Towel crunches        3x10 3x10       rubberbands completed completed completed completed   completed completed   completed   gripping        Dowel and soft putty Dowel and soft putty   completed   gripper   setting 1 ,colored pegs       setting 1 3 x10       Resistive gripping [id number]  RPW 3 x10        YPW 5x10 [id number]   Wrist PRE's 2#3x10 3#3x10 3#3x10  3#3x10           2# 3x10    power bar   Red-twist 3x10 Red- twist 3x10  red- twist 3 x10                                                                                                                                                                               Modalities  1/30 02/01 2/13 2/20 2/23 2/27  3/8  3/16        15min 15min 15min 15min 15min 15min  15min  15min                                                           Assessment: Tolerated treatment well  Patient would benefit from continued OT      Plan: Progress treatment as tolerated  Area H Indication Text: Tumors in this location are included in Area H (eyelids, eyebrows, nose, lips, chin, ear, pre-auricular, post-auricular, temple, genitalia, hands, feet, ankles and areola).  Tissue conservation is critical in these anatomic locations.

## (undated) DEVICE — GAUZE SPONGES,16 PLY: Brand: CURITY

## (undated) DEVICE — SUT ETHILON 3-0 FS-1 18 IN 663G

## (undated) DEVICE — GLOVE INDICATOR PI UNDERGLOVE SZ 8 BLUE

## (undated) DEVICE — PADDING CAST 4 IN  COTTON STRL

## (undated) DEVICE — NON-STERILE REUSABLE TOURNIQUET CUFF SINGLE BLADDER, DUAL PORT AND QUICK CONNECT CONNECTOR: Brand: COLOR CUFF

## (undated) DEVICE — BETADINE SURGICAL SCRUB 32OZ

## (undated) DEVICE — ACE WRAP 4 IN STERILE

## (undated) DEVICE — SUT PROLENE 4-0 RB1 36 IN 8357H

## (undated) DEVICE — SUT PROLENE 2-0 RB-1/RB-1 36 IN 8559H

## (undated) DEVICE — GLOVE SRG BIOGEL ECLIPSE 7.5

## (undated) DEVICE — MEDI-VAC NON-CONDUCTIVE SUCTION TUBING 6MM X 1.8M (6FT.) L: Brand: CARDINAL HEALTH

## (undated) DEVICE — CHLORAPREP HI-LITE 26ML ORANGE

## (undated) DEVICE — NEEDLE 25G X 1 1/2

## (undated) DEVICE — INTENDED FOR TISSUE SEPARATION, AND OTHER PROCEDURES THAT REQUIRE A SHARP SURGICAL BLADE TO PUNCTURE OR CUT.: Brand: BARD-PARKER SAFETY BLADES SIZE 15, STERILE

## (undated) DEVICE — REM POLYHESIVE ADULT PATIENT RETURN ELECTRODE: Brand: VALLEYLAB

## (undated) DEVICE — DISPOSABLE EQUIPMENT COVER: Brand: SMALL TOWEL DRAPE

## (undated) DEVICE — PACK PLASTIC HAND PBDS

## (undated) DEVICE — OCCLUSIVE GAUZE STRIP,3% BISMUTH TRIBROMOPHENATE IN PETROLATUM BLEND: Brand: XEROFORM

## (undated) DEVICE — CYSTO TUBING SINGLE IRRIGATION